# Patient Record
Sex: FEMALE | Race: WHITE | NOT HISPANIC OR LATINO | URBAN - METROPOLITAN AREA
[De-identification: names, ages, dates, MRNs, and addresses within clinical notes are randomized per-mention and may not be internally consistent; named-entity substitution may affect disease eponyms.]

---

## 2017-01-25 ENCOUNTER — OUTPATIENT (OUTPATIENT)
Dept: OUTPATIENT SERVICES | Facility: HOSPITAL | Age: 82
LOS: 1 days | End: 2017-01-25
Payer: MEDICARE

## 2017-01-25 ENCOUNTER — APPOINTMENT (OUTPATIENT)
Dept: ULTRASOUND IMAGING | Facility: CLINIC | Age: 82
End: 2017-01-25

## 2017-01-25 DIAGNOSIS — Z98.89 OTHER SPECIFIED POSTPROCEDURAL STATES: Chronic | ICD-10-CM

## 2017-01-25 DIAGNOSIS — K31.9 DISEASE OF STOMACH AND DUODENUM, UNSPECIFIED: Chronic | ICD-10-CM

## 2017-01-25 DIAGNOSIS — Z00.00 ENCOUNTER FOR GENERAL ADULT MEDICAL EXAMINATION WITHOUT ABNORMAL FINDINGS: ICD-10-CM

## 2017-01-25 DIAGNOSIS — Z95.1 PRESENCE OF AORTOCORONARY BYPASS GRAFT: Chronic | ICD-10-CM

## 2017-01-25 DIAGNOSIS — R92.8 OTHER ABNORMAL AND INCONCLUSIVE FINDINGS ON DIAGNOSTIC IMAGING OF BREAST: ICD-10-CM

## 2017-01-25 DIAGNOSIS — Z90.710 ACQUIRED ABSENCE OF BOTH CERVIX AND UTERUS: Chronic | ICD-10-CM

## 2017-01-25 PROCEDURE — 76642 ULTRASOUND BREAST LIMITED: CPT

## 2017-06-23 ENCOUNTER — APPOINTMENT (OUTPATIENT)
Dept: MAMMOGRAPHY | Facility: CLINIC | Age: 82
End: 2017-06-23

## 2017-06-23 ENCOUNTER — APPOINTMENT (OUTPATIENT)
Dept: ULTRASOUND IMAGING | Facility: CLINIC | Age: 82
End: 2017-06-23

## 2017-06-23 ENCOUNTER — OUTPATIENT (OUTPATIENT)
Dept: OUTPATIENT SERVICES | Facility: HOSPITAL | Age: 82
LOS: 1 days | End: 2017-06-23
Payer: MEDICARE

## 2017-06-23 DIAGNOSIS — Z00.8 ENCOUNTER FOR OTHER GENERAL EXAMINATION: ICD-10-CM

## 2017-06-23 DIAGNOSIS — Z98.89 OTHER SPECIFIED POSTPROCEDURAL STATES: Chronic | ICD-10-CM

## 2017-06-23 DIAGNOSIS — K31.9 DISEASE OF STOMACH AND DUODENUM, UNSPECIFIED: Chronic | ICD-10-CM

## 2017-06-23 DIAGNOSIS — Z95.1 PRESENCE OF AORTOCORONARY BYPASS GRAFT: Chronic | ICD-10-CM

## 2017-06-23 DIAGNOSIS — Z90.710 ACQUIRED ABSENCE OF BOTH CERVIX AND UTERUS: Chronic | ICD-10-CM

## 2017-06-23 PROCEDURE — 77063 BREAST TOMOSYNTHESIS BI: CPT

## 2017-06-23 PROCEDURE — 77067 SCR MAMMO BI INCL CAD: CPT

## 2017-06-23 PROCEDURE — 76641 ULTRASOUND BREAST COMPLETE: CPT

## 2018-01-15 ENCOUNTER — RESULT REVIEW (OUTPATIENT)
Age: 83
End: 2018-01-15

## 2018-01-22 ENCOUNTER — OUTPATIENT (OUTPATIENT)
Dept: OUTPATIENT SERVICES | Facility: HOSPITAL | Age: 83
LOS: 1 days | End: 2018-01-22
Payer: MEDICARE

## 2018-01-22 ENCOUNTER — APPOINTMENT (OUTPATIENT)
Dept: RADIOLOGY | Facility: CLINIC | Age: 83
End: 2018-01-22
Payer: MEDICARE

## 2018-01-22 DIAGNOSIS — Z90.710 ACQUIRED ABSENCE OF BOTH CERVIX AND UTERUS: Chronic | ICD-10-CM

## 2018-01-22 DIAGNOSIS — K31.9 DISEASE OF STOMACH AND DUODENUM, UNSPECIFIED: Chronic | ICD-10-CM

## 2018-01-22 DIAGNOSIS — Z98.89 OTHER SPECIFIED POSTPROCEDURAL STATES: Chronic | ICD-10-CM

## 2018-01-22 DIAGNOSIS — Z95.1 PRESENCE OF AORTOCORONARY BYPASS GRAFT: Chronic | ICD-10-CM

## 2018-01-22 DIAGNOSIS — Z00.8 ENCOUNTER FOR OTHER GENERAL EXAMINATION: ICD-10-CM

## 2018-01-22 PROCEDURE — 77080 DXA BONE DENSITY AXIAL: CPT | Mod: 26

## 2018-01-22 PROCEDURE — 77080 DXA BONE DENSITY AXIAL: CPT

## 2018-06-23 ENCOUNTER — APPOINTMENT (OUTPATIENT)
Dept: ULTRASOUND IMAGING | Facility: CLINIC | Age: 83
End: 2018-06-23
Payer: MEDICARE

## 2018-06-23 ENCOUNTER — APPOINTMENT (OUTPATIENT)
Dept: MAMMOGRAPHY | Facility: CLINIC | Age: 83
End: 2018-06-23
Payer: MEDICARE

## 2018-06-23 ENCOUNTER — OUTPATIENT (OUTPATIENT)
Dept: OUTPATIENT SERVICES | Facility: HOSPITAL | Age: 83
LOS: 1 days | End: 2018-06-23
Payer: MEDICARE

## 2018-06-23 DIAGNOSIS — Z95.1 PRESENCE OF AORTOCORONARY BYPASS GRAFT: Chronic | ICD-10-CM

## 2018-06-23 DIAGNOSIS — Z98.89 OTHER SPECIFIED POSTPROCEDURAL STATES: Chronic | ICD-10-CM

## 2018-06-23 DIAGNOSIS — K31.9 DISEASE OF STOMACH AND DUODENUM, UNSPECIFIED: Chronic | ICD-10-CM

## 2018-06-23 DIAGNOSIS — Z90.710 ACQUIRED ABSENCE OF BOTH CERVIX AND UTERUS: Chronic | ICD-10-CM

## 2018-06-23 DIAGNOSIS — Z00.8 ENCOUNTER FOR OTHER GENERAL EXAMINATION: ICD-10-CM

## 2018-06-23 PROCEDURE — 77063 BREAST TOMOSYNTHESIS BI: CPT | Mod: 26

## 2018-06-23 PROCEDURE — 77067 SCR MAMMO BI INCL CAD: CPT | Mod: 26

## 2018-06-23 PROCEDURE — 77063 BREAST TOMOSYNTHESIS BI: CPT

## 2018-06-23 PROCEDURE — 77067 SCR MAMMO BI INCL CAD: CPT

## 2019-01-04 ENCOUNTER — RECORD ABSTRACTING (OUTPATIENT)
Age: 84
End: 2019-01-04

## 2019-01-04 DIAGNOSIS — Z87.59 PERSONAL HISTORY OF OTHER COMPLICATIONS OF PREGNANCY, CHILDBIRTH AND THE PUERPERIUM: ICD-10-CM

## 2019-01-04 DIAGNOSIS — Z80.1 FAMILY HISTORY OF MALIGNANT NEOPLASM OF TRACHEA, BRONCHUS AND LUNG: ICD-10-CM

## 2019-01-04 DIAGNOSIS — Z86.19 PERSONAL HISTORY OF OTHER INFECTIOUS AND PARASITIC DISEASES: ICD-10-CM

## 2019-01-04 DIAGNOSIS — K43.9 VENTRAL HERNIA W/OUT OBSTRUCTION OR GANGRENE: ICD-10-CM

## 2019-01-04 DIAGNOSIS — Z63.4 DISAPPEARANCE AND DEATH OF FAMILY MEMBER: ICD-10-CM

## 2019-01-04 DIAGNOSIS — I10 ESSENTIAL (PRIMARY) HYPERTENSION: ICD-10-CM

## 2019-01-04 DIAGNOSIS — Z86.39 PERSONAL HISTORY OF OTHER ENDOCRINE, NUTRITIONAL AND METABOLIC DISEASE: ICD-10-CM

## 2019-01-04 DIAGNOSIS — Z80.49 FAMILY HISTORY OF MALIGNANT NEOPLASM OF OTHER GENITAL ORGANS: ICD-10-CM

## 2019-01-04 LAB — CYTOLOGY CVX/VAG DOC THIN PREP: NORMAL

## 2019-01-04 RX ORDER — MULTIVITAMIN
CAPSULE ORAL
Refills: 0 | Status: ACTIVE | COMMUNITY

## 2019-01-04 RX ORDER — CARVEDILOL 3.12 MG/1
3.12 TABLET, FILM COATED ORAL
Refills: 0 | Status: ACTIVE | COMMUNITY

## 2019-01-04 RX ORDER — SIMVASTATIN 20 MG/1
20 TABLET, FILM COATED ORAL
Refills: 0 | Status: ACTIVE | COMMUNITY

## 2019-01-04 SDOH — SOCIAL STABILITY - SOCIAL INSECURITY: DISSAPEARANCE AND DEATH OF FAMILY MEMBER: Z63.4

## 2019-01-30 ENCOUNTER — APPOINTMENT (OUTPATIENT)
Dept: OBGYN | Facility: CLINIC | Age: 84
End: 2019-01-30
Payer: MEDICARE

## 2019-01-30 VITALS
SYSTOLIC BLOOD PRESSURE: 108 MMHG | WEIGHT: 128 LBS | HEIGHT: 60 IN | DIASTOLIC BLOOD PRESSURE: 60 MMHG | BODY MASS INDEX: 25.13 KG/M2

## 2019-01-30 PROCEDURE — 82270 OCCULT BLOOD FECES: CPT

## 2019-01-30 PROCEDURE — G0101: CPT

## 2019-01-30 PROCEDURE — 99397 PER PM REEVAL EST PAT 65+ YR: CPT

## 2019-01-30 PROCEDURE — 81003 URINALYSIS AUTO W/O SCOPE: CPT | Mod: QW

## 2019-03-10 LAB
BILIRUB UR QL STRIP: NORMAL
CLARITY UR: CLEAR
COLLECTION METHOD: NORMAL
DATE COLLECTED: NORMAL
GLUCOSE UR-MCNC: NORMAL
HCG UR QL: 0.2 EU/DL
HEMOCCULT SP1 STL QL: NEGATIVE
HGB UR QL STRIP.AUTO: ABNORMAL
KETONES UR-MCNC: NORMAL
LEUKOCYTE ESTERASE UR QL STRIP: NORMAL
NITRITE UR QL STRIP: POSITIVE
PH UR STRIP: 5.5
PROT UR STRIP-MCNC: NORMAL
SP GR UR STRIP: 1.02

## 2019-04-22 NOTE — PHYSICAL EXAM
[Awake] : awake [Alert] : alert [Soft] : soft [Oriented x3] : oriented to person, place, and time [Normal] : urethra [Atrophy] : atrophy [Dry Mucosa] : dry mucosa [No Bleeding] : there was no active vaginal bleeding [Absent] : absent [Uterine Adnexae] : were not tender and not enlarged [No Tenderness] : no rectal tenderness [RRR, No Murmurs] : RRR, no murmurs [CTAB] : CTAB [Acute Distress] : no acute distress [Mass] : no breast mass [Nipple Discharge] : no nipple discharge [Axillary LAD] : no axillary lymphadenopathy [Tender] : non tender [Discharge] : no discharge [Tenderness] : no tenderness [Occult Blood] : occult blood test from digital rectal exam was negative

## 2019-06-28 ENCOUNTER — OUTPATIENT (OUTPATIENT)
Dept: OUTPATIENT SERVICES | Facility: HOSPITAL | Age: 84
LOS: 1 days | End: 2019-06-28
Payer: MEDICARE

## 2019-06-28 ENCOUNTER — APPOINTMENT (OUTPATIENT)
Dept: MAMMOGRAPHY | Facility: CLINIC | Age: 84
End: 2019-06-28
Payer: MEDICARE

## 2019-06-28 DIAGNOSIS — Z98.89 OTHER SPECIFIED POSTPROCEDURAL STATES: Chronic | ICD-10-CM

## 2019-06-28 DIAGNOSIS — Z90.710 ACQUIRED ABSENCE OF BOTH CERVIX AND UTERUS: Chronic | ICD-10-CM

## 2019-06-28 DIAGNOSIS — Z00.00 ENCOUNTER FOR GENERAL ADULT MEDICAL EXAMINATION WITHOUT ABNORMAL FINDINGS: ICD-10-CM

## 2019-06-28 DIAGNOSIS — Z95.1 PRESENCE OF AORTOCORONARY BYPASS GRAFT: Chronic | ICD-10-CM

## 2019-06-28 DIAGNOSIS — K31.9 DISEASE OF STOMACH AND DUODENUM, UNSPECIFIED: Chronic | ICD-10-CM

## 2019-06-28 PROCEDURE — 77063 BREAST TOMOSYNTHESIS BI: CPT

## 2019-06-28 PROCEDURE — 77067 SCR MAMMO BI INCL CAD: CPT

## 2019-06-28 PROCEDURE — 77063 BREAST TOMOSYNTHESIS BI: CPT | Mod: 26

## 2019-06-28 PROCEDURE — 77067 SCR MAMMO BI INCL CAD: CPT | Mod: 26

## 2019-09-19 ENCOUNTER — INPATIENT (INPATIENT)
Facility: HOSPITAL | Age: 84
LOS: 3 days | Discharge: HOME | DRG: 377 | End: 2019-09-23
Attending: HOSPITALIST | Admitting: INTERNAL MEDICINE
Payer: MEDICARE

## 2019-09-19 VITALS
TEMPERATURE: 98 F | OXYGEN SATURATION: 99 % | HEART RATE: 105 BPM | HEIGHT: 65 IN | SYSTOLIC BLOOD PRESSURE: 130 MMHG | RESPIRATION RATE: 18 BRPM | DIASTOLIC BLOOD PRESSURE: 84 MMHG | WEIGHT: 134.92 LBS

## 2019-09-19 DIAGNOSIS — K31.9 DISEASE OF STOMACH AND DUODENUM, UNSPECIFIED: Chronic | ICD-10-CM

## 2019-09-19 DIAGNOSIS — Z95.1 PRESENCE OF AORTOCORONARY BYPASS GRAFT: Chronic | ICD-10-CM

## 2019-09-19 DIAGNOSIS — Z98.89 OTHER SPECIFIED POSTPROCEDURAL STATES: Chronic | ICD-10-CM

## 2019-09-19 DIAGNOSIS — K92.2 GASTROINTESTINAL HEMORRHAGE, UNSPECIFIED: ICD-10-CM

## 2019-09-19 DIAGNOSIS — K92.1 MELENA: ICD-10-CM

## 2019-09-19 DIAGNOSIS — Z90.710 ACQUIRED ABSENCE OF BOTH CERVIX AND UTERUS: Chronic | ICD-10-CM

## 2019-09-19 LAB
ALBUMIN SERPL ELPH-MCNC: 3.2 G/DL — LOW (ref 3.3–5.2)
ALP SERPL-CCNC: 39 U/L — LOW (ref 40–120)
ALT FLD-CCNC: 17 U/L — SIGNIFICANT CHANGE UP
ANION GAP SERPL CALC-SCNC: 17 MMOL/L — SIGNIFICANT CHANGE UP (ref 5–17)
APTT BLD: 24.3 SEC — LOW (ref 27.5–36.3)
AST SERPL-CCNC: 16 U/L — SIGNIFICANT CHANGE UP
BILIRUB SERPL-MCNC: <0.2 MG/DL — LOW (ref 0.4–2)
BLD GP AB SCN SERPL QL: SIGNIFICANT CHANGE UP
BUN SERPL-MCNC: 49 MG/DL — HIGH (ref 8–20)
CALCIUM SERPL-MCNC: 8.6 MG/DL — SIGNIFICANT CHANGE UP (ref 8.6–10.2)
CHLORIDE SERPL-SCNC: 108 MMOL/L — HIGH (ref 98–107)
CO2 SERPL-SCNC: 18 MMOL/L — LOW (ref 22–29)
CREAT SERPL-MCNC: 0.93 MG/DL — SIGNIFICANT CHANGE UP (ref 0.5–1.3)
GLUCOSE SERPL-MCNC: 162 MG/DL — HIGH (ref 70–115)
HCT VFR BLD CALC: 12.8 % — CRITICAL LOW (ref 34.5–45)
HCT VFR BLD CALC: 22.7 % — LOW (ref 34.5–45)
HCT VFR BLD CALC: 25.7 % — LOW (ref 34.5–45)
HGB BLD-MCNC: 4.1 G/DL — CRITICAL LOW (ref 11.5–15.5)
HGB BLD-MCNC: 7.9 G/DL — LOW (ref 11.5–15.5)
HGB BLD-MCNC: 8.7 G/DL — LOW (ref 11.5–15.5)
INR BLD: 1.13 RATIO — SIGNIFICANT CHANGE UP (ref 0.88–1.16)
MCHC RBC-ENTMCNC: 30.1 PG — SIGNIFICANT CHANGE UP (ref 27–34)
MCHC RBC-ENTMCNC: 32 GM/DL — SIGNIFICANT CHANGE UP (ref 32–36)
MCV RBC AUTO: 94.1 FL — SIGNIFICANT CHANGE UP (ref 80–100)
PLATELET # BLD AUTO: 332 K/UL — SIGNIFICANT CHANGE UP (ref 150–400)
POTASSIUM SERPL-MCNC: 3.9 MMOL/L — SIGNIFICANT CHANGE UP (ref 3.5–5.3)
POTASSIUM SERPL-SCNC: 3.9 MMOL/L — SIGNIFICANT CHANGE UP (ref 3.5–5.3)
PROT SERPL-MCNC: 5.1 G/DL — LOW (ref 6.6–8.7)
PROTHROM AB SERPL-ACNC: 13 SEC — HIGH (ref 10–12.9)
RBC # BLD: 1.36 M/UL — LOW (ref 3.8–5.2)
RBC # FLD: 14.4 % — SIGNIFICANT CHANGE UP (ref 10.3–14.5)
SODIUM SERPL-SCNC: 143 MMOL/L — SIGNIFICANT CHANGE UP (ref 135–145)
TROPONIN T SERPL-MCNC: <0.01 NG/ML — SIGNIFICANT CHANGE UP (ref 0–0.06)
WBC # BLD: 21.32 K/UL — HIGH (ref 3.8–10.5)
WBC # FLD AUTO: 21.32 K/UL — HIGH (ref 3.8–10.5)

## 2019-09-19 PROCEDURE — 73502 X-RAY EXAM HIP UNI 2-3 VIEWS: CPT | Mod: 26,LT

## 2019-09-19 PROCEDURE — 99291 CRITICAL CARE FIRST HOUR: CPT | Mod: 25

## 2019-09-19 PROCEDURE — 99222 1ST HOSP IP/OBS MODERATE 55: CPT

## 2019-09-19 PROCEDURE — 71045 X-RAY EXAM CHEST 1 VIEW: CPT | Mod: 26

## 2019-09-19 PROCEDURE — 93010 ELECTROCARDIOGRAM REPORT: CPT

## 2019-09-19 PROCEDURE — 74174 CTA ABD&PLVS W/CONTRAST: CPT | Mod: 26

## 2019-09-19 PROCEDURE — 70450 CT HEAD/BRAIN W/O DYE: CPT | Mod: 26

## 2019-09-19 PROCEDURE — 36556 INSERT NON-TUNNEL CV CATH: CPT

## 2019-09-19 RX ORDER — SODIUM CHLORIDE 9 MG/ML
1000 INJECTION INTRAMUSCULAR; INTRAVENOUS; SUBCUTANEOUS ONCE
Refills: 0 | Status: COMPLETED | OUTPATIENT
Start: 2019-09-19 | End: 2019-09-19

## 2019-09-19 RX ORDER — METOCLOPRAMIDE HCL 10 MG
10 TABLET ORAL ONCE
Refills: 0 | Status: COMPLETED | OUTPATIENT
Start: 2019-09-19 | End: 2019-09-19

## 2019-09-19 RX ORDER — PANTOPRAZOLE SODIUM 20 MG/1
80 TABLET, DELAYED RELEASE ORAL ONCE
Refills: 0 | Status: COMPLETED | OUTPATIENT
Start: 2019-09-19 | End: 2019-09-19

## 2019-09-19 RX ORDER — CHLORHEXIDINE GLUCONATE 213 G/1000ML
1 SOLUTION TOPICAL
Refills: 0 | Status: DISCONTINUED | OUTPATIENT
Start: 2019-09-19 | End: 2019-09-20

## 2019-09-19 RX ORDER — FENTANYL CITRATE 50 UG/ML
25 INJECTION INTRAVENOUS ONCE
Refills: 0 | Status: DISCONTINUED | OUTPATIENT
Start: 2019-09-19 | End: 2019-09-19

## 2019-09-19 RX ORDER — PANTOPRAZOLE SODIUM 20 MG/1
8 TABLET, DELAYED RELEASE ORAL
Qty: 80 | Refills: 0 | Status: DISCONTINUED | OUTPATIENT
Start: 2019-09-19 | End: 2019-09-20

## 2019-09-19 RX ADMIN — SODIUM CHLORIDE 1000 MILLILITER(S): 9 INJECTION INTRAMUSCULAR; INTRAVENOUS; SUBCUTANEOUS at 10:10

## 2019-09-19 RX ADMIN — PANTOPRAZOLE SODIUM 80 MILLIGRAM(S): 20 TABLET, DELAYED RELEASE ORAL at 10:04

## 2019-09-19 RX ADMIN — SODIUM CHLORIDE 1000 MILLILITER(S): 9 INJECTION INTRAMUSCULAR; INTRAVENOUS; SUBCUTANEOUS at 10:44

## 2019-09-19 RX ADMIN — FENTANYL CITRATE 25 MICROGRAM(S): 50 INJECTION INTRAVENOUS at 12:46

## 2019-09-19 RX ADMIN — SODIUM CHLORIDE 1000 MILLILITER(S): 9 INJECTION INTRAMUSCULAR; INTRAVENOUS; SUBCUTANEOUS at 09:06

## 2019-09-19 RX ADMIN — FENTANYL CITRATE 25 MICROGRAM(S): 50 INJECTION INTRAVENOUS at 12:26

## 2019-09-19 RX ADMIN — PANTOPRAZOLE SODIUM 10 MG/HR: 20 TABLET, DELAYED RELEASE ORAL at 10:04

## 2019-09-19 RX ADMIN — SODIUM CHLORIDE 1000 MILLILITER(S): 9 INJECTION INTRAMUSCULAR; INTRAVENOUS; SUBCUTANEOUS at 11:45

## 2019-09-19 RX ADMIN — Medication 10 MILLIGRAM(S): at 10:43

## 2019-09-19 NOTE — H&P ADULT - NSICDXPASTSURGICALHX_GEN_ALL_CORE_FT
PAST SURGICAL HISTORY:  Gastric disorder gastric hernia repair- 3/16/12    S/P arthroscopy of shoulder 2001- left rotator cuff repair    S/P arthroscopy of shoulder right rotator cuff repair- 2006    S/P CABG x 3 8/18/11    S/P discectomy lumbar- 1996    S/P hysterectomy 1972

## 2019-09-19 NOTE — ED ADULT NURSE REASSESSMENT NOTE - NS ED NURSE REASSESS COMMENT FT1
Pt. mental status unchanged. Pt. respirations even and unlabored. 2nd PBRC hung at 1305. Pt. educated to inform nursing staff of SOB, back pain and other transfusion reactions. Pt. mental status unchanged. Pt. respirations even and unlabored. 2nd PBRC hung at 1305. Pt. educated to inform nursing staff of SOB, back pain and other transfusion reactions. Pt. going to MICU.

## 2019-09-19 NOTE — H&P ADULT - ASSESSMENT
86 y/o female with PMH of CAD s/p CABG, arthritis, hiatal hernia presents to [facility] for 2 days history of black stools and dark vomitus. Found to have GI bleed with hypotension and mild tachycardia, most likely UGIB possible ulcer vs. slow LGIB. Will admit to MICU for further management and observation.    Plan discussed with ICU attending Dr. Cárdenas      Problem List:   1) GI bleed  2) acute blood loss anemia  3) CAD        1) GI bleed  - Hgb 4   - s/p 2L IVF and 1 unit PRBCs. Will order additional 1 unit PRBC and recheck hgb  - Aim for hgb around 7  - GI consult placed  - protonix gtt  - CTA abdomen/pelvis without etiology of bleed  - Not on anticoagulants     2) Aute blood loss anemia  - from GI bleed  - transfuse    3) CAD  -hold ASA/statin/beta blocker/ ACE-I       DISPO: to ICU, patient DNR per daughter, will bring in paperwork       TIME SPENT: 40 min

## 2019-09-19 NOTE — ED ADULT NURSE REASSESSMENT NOTE - NS ED NURSE REASSESS COMMENT FT1
Pt BP dropping, Dr. Phan called and made aware, no acute s/s of respiratory distress noted or reported at this time, report given to critical care RN DM

## 2019-09-19 NOTE — ED ADULT TRIAGE NOTE - CHIEF COMPLAINT QUOTE
Pt with black diarrhea, lower abdominal pain and coffee ground emesis since Sunday. Pt on takes ASA 81mg daily. Pt reports stool was "pouring out". Appears very pale. Received Zofran 4mg IVP by EMS

## 2019-09-19 NOTE — ED PROVIDER NOTE - PMH
Arthritis    CAD (coronary artery disease)    Disc disorder of lumbar region    Drop foot gait  left foot  Hernia  hiatal, gastric hernias  Hernia, ventral    HLD (hyperlipidemia)    HTN (hypertension)    Rotator cuff (capsule) sprain

## 2019-09-19 NOTE — ED ADULT NURSE NOTE - FAMILY HISTORY
Family history of lung cancer, son- small cell carcinoma  at 43 y/o     Father  Still living? No  Family history of coronary artery disease, Age at diagnosis: Age Unknown     Mother  Still living? No  Family history of Alzheimer's disease, Age at diagnosis: Age Unknown     Child  Still living? No  Family history of Hodgkin's lymphoma, Age at diagnosis: Age Unknown

## 2019-09-19 NOTE — ED PROVIDER NOTE - CARE PLAN
Principal Discharge DX:	GI bleed requiring more than 4 units of blood in 24 hours, ICU, or surgery  Secondary Diagnosis:	Iron deficiency anemia due to chronic blood loss

## 2019-09-19 NOTE — ED ADULT NURSE NOTE - NSIMPLEMENTINTERV_GEN_ALL_ED
Implemented All Fall Risk Interventions:  [place] to call system. Call bell, personal items and telephone within reach. Instruct patient to call for assistance. Room bathroom lighting operational. Non-slip footwear when patient is off stretcher. Physically safe environment: no spills, clutter or unnecessary equipment. Stretcher in lowest position, wheels locked, appropriate side rails in place. Provide visual cue, wrist band, yellow gown, etc. Monitor gait and stability. Monitor for mental status changes and reorient to person, place, and time. Review medications for side effects contributing to fall risk. Reinforce activity limits and safety measures with patient and family.

## 2019-09-19 NOTE — ED PROVIDER NOTE - OBJECTIVE STATEMENT
Patient with PMH CAD s/p CABG (on ASA), HTN, HLD presents complaining of 3 days of dark tarry stools with vomiting dark substance. She was seen by Dr. Wells (GI) who started her on an acid reflux medication. Patient states that last night she had a large amount of liquid dark stool that was "pouring out of me" until about 4 am. Per daughter she appears very pale. Patient states she also fell yesterday while walking in the kitchen, hit her head but did not lose consciousness. She was feeling very short of breath this morning and it improved when ambulance arrived and placed NC O2. She also notes some chest pain yesterday. She has never had anything like this before. Her abdominal pain is minimal at this time.

## 2019-09-19 NOTE — H&P ADULT - HISTORY OF PRESENT ILLNESS
86 y/o female with PMH of CAD s/p CABG, arthritis, hiatal hernia presents to [facility] for 2 days history of black stools and dark vomitus. Per patient and daughter at bedside she has not been feeling well for about 3 weeks, has been loosing weight and decreased PO intake. Yesterday she states she had dark stools and vomited dark substance x1. All day she was " not feeling well". Takes aspirin 325mg daily. No other anticoagulants or anti-platelets. Denies NSAID use. Denies alcohol abuse or history of cirrhosis. Patient is alert and oriented and provides good history at the bedside.     On arrival to the ER found to be hypotensive to 80s, tachy to 110. Hgb found to be 4. CTA abdomen/pelvis without etiology of bleeding. Central line placed by ER team, got 2 L IVF, and 1 unit of PRBCs infusing as I saw the patient. BP at that time 101/74. .

## 2019-09-19 NOTE — ED ADULT NURSE NOTE - PSH
Gastric disorder  gastric hernia repair- 3/16/12  S/P arthroscopy of shoulder  right rotator cuff repair- 2006  S/P arthroscopy of shoulder  2001- left rotator cuff repair  S/P CABG x 3  8/18/11  S/P discectomy  lumbar- 1996  S/P hysterectomy  1972

## 2019-09-19 NOTE — ED ADULT NURSE NOTE - OBJECTIVE STATEMENT
Patient  presents to ED withg c/o dark tarry stools x 3 days associated  with vomiting dark substance that began around about 4 am. Per daughter she appears very pale. Patient presents to ED A/Ox4, VSS, denies chest pain or sob.  Respirations are even and unlabored, lungs cta, +bowel x4 quads, abdomen soft, nontender/nondistended, skin w/d/i. Patient states she also fell yesterday while walking in the kitchen, hit her head but did not lose consciousness.

## 2019-09-19 NOTE — ED ADULT NURSE REASSESSMENT NOTE - NS ED NURSE REASSESS COMMENT FT1
Pt brought back to critical care r/t significant drop in BP and central line placement.  Dr. Phan and Dr. Paniagua at bedside, will continue to monitor

## 2019-09-19 NOTE — CONSULT NOTE ADULT - SUBJECTIVE AND OBJECTIVE BOX
Patient is a 87y old  Female who presents with a chief complaint of dark loose stool    HPI: 87 year old female with onset of very dark, possibly black stools three days ago. Yesterday she experienced voluminous dark/black loose stools that lasted throughout the night. Hbg 4.1. Earlier today she had some dry haves and vomited a small amount of clear liquid. Since  she has a known hiatal hernia with early satiety with epigastric pain if she would over eat and as a result has frequent very small meals. For the past several weeks these symptoms are worse with a significant decrease in her appetite as well as occasional heartburn. One week ago apparantly placed on a PPI by Dr. Wells with some decrease in her symptoms. No prior hx of PUD or EGD. She has undergone two prior unremarkable colonoscopies. 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      REVIEW OF SYSTEMS:    CONSTITUTIONAL: No fever, weight loss, or fatigue  EYES: No eye pain, visual disturbances, or discharge  ENMT:  No difficulty hearing, tinnitus, vertigo; No sinus or throat pain  NECK: No pain or stiffness  RESPIRATORY: No cough, wheezing, chills or hemoptysis; No shortness of breath  CARDIOVASCULAR: No chest pain, palpitations, dizziness, or leg swelling  GASTROINTESTINAL: as above  NEUROLOGICAL: No headaches, memory loss, loss of strength, numbness, or tremors  SKIN: No itching, burning, rashes, or lesions   LYMPH NODES: No enlarged glands  MUSCULOSKELETAL: No joint pain or swelling; No muscle, back, or extremity pain  PSYCHIATRIC: No depression, anxiety, mood swings, or difficulty sleeping  HEME/LYMPH: No easy bruising, or bleeding gums  ALLERY AND IMMUNOLOGIC: No hives or eczema      PAST MEDICAL & SURGICAL HISTORY:  Drop foot gait: left foot  Disc disorder of lumbar region  Hernia: hiatal, gastric hernias  Hernia, ventral  Rotator cuff (capsule) sprain  HLD (hyperlipidemia)  HTN (hypertension)  CAD (coronary artery disease)  Arthritis  S/P discectomy: lumbar-   ventral or incisional hernia repair  S/P CABG x 3: 11  S/P arthroscopy of shoulder: right rotator cuff repair-   S/P arthroscopy of shoulder: - left rotator cuff repair  S/P hysterectomy: 1972      FAMILY HISTORY:  Family history of lung cancer: son- small cell carcinoma  at 43 y/o  Family history of Hodgkin's lymphoma (Child): son  at 44.  Family history of Alzheimer's disease: mother  at 93 complications of alzheimers  Family history of coronary artery disease: father  at 52 from MI      SOCIAL HISTORY:  Smoking Status: [ ] Current, [ ] Former, [x ] Never  Pack Years:  Alcohol Use: rare    Home Medications:  aspirin 325 mg oral tablet: 1 tab(s) orally once a day  (14 Aug 2014 10:04)  carvedilol 3.125 mg oral tablet: 1 tab(s) orally 2 times a day (14 Aug 2014 10:04)  Colace sodium 100 mg oral capsule: 1 cap(s) orally 2 times a day (14 Aug 2014 10:04)  enalapril 2.5 mg oral tablet: 1 tab(s) orally once a day (14 Aug 2014 10:04)  furosemide: 10 milligram(s) orally once a day (14 Aug 2014 10:04)  oxyCODONE 10 mg oral tablet: 1 tab(s) orally every 4 hours, As needed, Severe Pain (16 Aug 2014 08:36)  oxyCODONE 5 mg oral tablet: 1 tab(s) orally every 4 hours, As needed, Moderate Pain (16 Aug 2014 08:36)  simvastatin 20 mg oral tablet: 1 tab(s) orally once a day (at bedtime) (14 Aug 2014 10:04)      MEDICATIONS:  MEDICATIONS  (STANDING):  chlorhexidine 2% Cloths 1 Application(s) Topical <User Schedule>  pantoprazole Infusion 8 mG/Hr (10 mL/Hr) IV Continuous <Continuous>    MEDICATIONS  (PRN):      Allergies    No Known Allergies    Intolerances        Vital Signs Last 24 Hrs  T(C): 36.5 (19 Sep 2019 12:05), Max: 36.7 (19 Sep 2019 08:59)  T(F): 97.7 (19 Sep 2019 12:05), Max: 98.1 (19 Sep 2019 08:59)  HR: 101 (19 Sep 2019 12:32) (89 - 105)  BP: 102/54 (19 Sep 2019 12:32) (70/42 - 132/59)  BP(mean): 73 (19 Sep 2019 12:05) (62 - 73)  RR: 20 (19 Sep 2019 12:32) (10 - 22)  SpO2: 100% (19 Sep 2019 12:32) (99% - 100%)        PHYSICAL EXAM:    General: Well developed; well nourished; in no acute distress, very pale  HEENT: MMM, conjunctiva and sclera clear  Lungs: Clear  Heart: Rhythm Regular, No Murmurs  Gastrointestinal: Soft, non-tender non-distended; Normal bowel sounds; No rebound or guarding; No Organomegaly & No Masses  Extremities: Normal range of motion, No clubbing, cyanosis or edema  Neurological: Alert and oriented x3, Non-focal  Skin: Warm and dry. No obvious rash  Rectal: No Masses, small amount of dark red tinged formed stool      LABS:                        4.1    21.32 )-----------( 332      ( 19 Sep 2019 08:50 )             12.8         143  |  108<H>  |  49.0<H>  ----------------------------<  162<H>  3.9   |  18.0<L>  |  0.93    Ca    8.6      19 Sep 2019 08:50    TPro  5.1<L>  /  Alb  3.2<L>  /  TBili  <0.2<L>  /  DBili  x   /  AST  16  /  ALT  17  /  AlkPhos  39<L>            RADIOLOGY & ADDITIONAL STUDIES:    < from: CT Angio Abdomen and Pelvis w/ IV Cont (19 @ 10:04) >   EXAM:  CT ANGIO ABD PELV (W)AW IC                          PROCEDURE DATE:  2019          INTERPRETATION:  Contrast enhanced CT of the abdomen and pelvis . GI   bleeding scan protocol  COMPARISON: None.    CLINICAL HISTORY:GI bleed..    Technique: contiguous axial images were obtained with 2.5 mm slice   thickness prior to and after intravenous contrast administration.  Reformatted sagittal, coronal images were obtained in the noncontrast,   vascular phase and delayed 122 second phase ofbleeding protocol  100 ml of Omnipaque were injected intravenously, and 0 ml were discarded,   without complications noted.   Maximum intensity projection,(MIP) 3-D,  imaging was created and   interpreted.  FINDINGS:   No evidence of active gastrointestinal bleeding seen.  The lung bases are clear.     The visualized portions of the heart are normal.    There is no free intra-abdominal air or ascites.    The liver, spleen, pancreas, adrenal glands, gallbladder are normal.    There is no intra or extrahepatic biliary ductal dilatation.  I there is a moderate gastroesophageal hiatal hernia.  The stomach, duodenum, small and large bowel and appendix are within   normal limits.    Both kidneys show normal uptake of contrast media without masses or   hydronephrosis.      The urinary bladder shows normal morphology and contour.    Status post hysterectomy. There are no retroperitoneal masses or abnormal   lymphadenopathy.  The retroperitoneal vessels show atherosclerotic calcified plaques   throughout  nondilated abdominal aorta and iliac arteries. An  There is a high-grade stenoses involving the origins of the celiac and   superior mesenteric arteries. Inferior mesenteric artery is not clearly   visualized. There is severe degenerative spondylosis of scoliotic lumbar   spine convex to the LEFT without acute fracture deformity.    IMPRESSION:     No evidence of active GI bleeding as described. 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   Inferior mesenteric artery not visualized.                  WILLIAM MEDINA M.D., ATTENDING RADIOLOGIST  This document has been electronically signed. Sep 19 2019 10:21AM                  < end of copied text > Patient is a 87y old  Female who presents with a chief complaint of dark loose stool    HPI: 87 year old female with onset of very dark, possibly black stools three days ago. Yesterday she experienced voluminous dark/black loose stools that lasted throughout the night with intermittant mild lower abdominal cramps. On arrival to ER Hbg 4.1. Earlier today she had some dry haves and vomited a small amount of clear liquid. Also with transient hypotension while in ER with BP of 70/40 which responded to fluids and blood. Noted some SOB and transient chest pain earlier today which has resolved as well. Since 2012 she has a known hiatal hernia with early satiety with epigastric pain if she would over eat and as a result has frequent very small meals. For the past several weeks these symptoms are worse with a significant decrease in her appetite as well as occasional heartburn. One week ago apparantly placed on a PPI by Dr. Wells with some decrease in her symptoms. No prior hx of PUD or EGD. She has undergone two prior unremarkable colonoscopies. 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      REVIEW OF SYSTEMS:    CONSTITUTIONAL: No fever, weight loss, or fatigue  EYES: No eye pain, visual disturbances, or discharge  ENMT:  No difficulty hearing, tinnitus, vertigo; No sinus or throat pain  NECK: No pain or stiffness  RESPIRATORY: No cough, wheezing, chills or hemoptysis; No shortness of breath  CARDIOVASCULAR: No chest pain, palpitations, dizziness, or leg swelling  GASTROINTESTINAL: as above  NEUROLOGICAL: No headaches, memory loss, loss of strength, numbness, or tremors  SKIN: No itching, burning, rashes, or lesions   LYMPH NODES: No enlarged glands  MUSCULOSKELETAL: No joint pain or swelling; No muscle, back, or extremity pain  PSYCHIATRIC: No depression, anxiety, mood swings, or difficulty sleeping  HEME/LYMPH: No easy bruising, or bleeding gums  ALLERY AND IMMUNOLOGIC: No hives or eczema      PAST MEDICAL & SURGICAL HISTORY:  Drop foot gait: left foot  Disc disorder of lumbar region  Hernia: hiatal, gastric hernias  Hernia, ventral  Rotator cuff (capsule) sprain  HLD (hyperlipidemia)  HTN (hypertension)  CAD (coronary artery disease)  Arthritis  S/P discectomy: lumbar-   ventral or incisional hernia repair  S/P CABG x 3: 11  S/P arthroscopy of shoulder: right rotator cuff repair-   S/P arthroscopy of shoulder: - left rotator cuff repair  S/P hysterectomy:       FAMILY HISTORY:  Family history of lung cancer: son- small cell carcinoma  at 45 y/o  Family history of Hodgkin's lymphoma (Child): son  at 44.  Family history of Alzheimer's disease: mother  at 93 complications of alzheimers  Family history of coronary artery disease: father  at 52 from MI      SOCIAL HISTORY:  Smoking Status: [ ] Current, [ ] Former, [x ] Never  Pack Years:  Alcohol Use: rare    Home Medications:  aspirin 325 mg oral tablet: 1 tab(s) orally once a day  (14 Aug 2014 10:04)  carvedilol 3.125 mg oral tablet: 1 tab(s) orally 2 times a day (14 Aug 2014 10:04)  Colace sodium 100 mg oral capsule: 1 cap(s) orally 2 times a day (14 Aug 2014 10:04)  enalapril 2.5 mg oral tablet: 1 tab(s) orally once a day (14 Aug 2014 10:04)  furosemide: 10 milligram(s) orally once a day (14 Aug 2014 10:04)  oxyCODONE 10 mg oral tablet: 1 tab(s) orally every 4 hours, As needed, Severe Pain (16 Aug 2014 08:36)  oxyCODONE 5 mg oral tablet: 1 tab(s) orally every 4 hours, As needed, Moderate Pain (16 Aug 2014 08:36)  simvastatin 20 mg oral tablet: 1 tab(s) orally once a day (at bedtime) (14 Aug 2014 10:04)      MEDICATIONS:  MEDICATIONS  (STANDING):  chlorhexidine 2% Cloths 1 Application(s) Topical <User Schedule>  pantoprazole Infusion 8 mG/Hr (10 mL/Hr) IV Continuous <Continuous>    MEDICATIONS  (PRN):      Allergies    No Known Allergies    Intolerances        Vital Signs Last 24 Hrs  T(C): 36.5 (19 Sep 2019 12:05), Max: 36.7 (19 Sep 2019 08:59)  T(F): 97.7 (19 Sep 2019 12:05), Max: 98.1 (19 Sep 2019 08:59)  HR: 101 (19 Sep 2019 12:32) (89 - 105)  BP: 102/54 (19 Sep 2019 12:32) (70/42 - 132/59)  BP(mean): 73 (19 Sep 2019 12:05) (62 - 73)  RR: 20 (19 Sep 2019 12:32) (10 - 22)  SpO2: 100% (19 Sep 2019 12:32) (99% - 100%)        PHYSICAL EXAM:    General: Well developed; well nourished; in no acute distress, very pale  HEENT: MMM, conjunctiva and sclera clear  Lungs: Clear  Heart: Rhythm Regular, No Murmurs  Gastrointestinal: Soft, non-tender non-distended; Normal bowel sounds; No rebound or guarding; No Organomegaly & No Masses  Extremities: Normal range of motion, No clubbing, cyanosis or edema  Neurological: Alert and oriented x3, Non-focal  Skin: Warm and dry. No obvious rash  Rectal: No Masses, small amount of dark red tinged formed stool      LABS:                        4.1    21.32 )-----------( 332      ( 19 Sep 2019 08:50 )             12.8         143  |  108<H>  |  49.0<H>  ----------------------------<  162<H>  3.9   |  18.0<L>  |  0.93    Ca    8.6      19 Sep 2019 08:50    TPro  5.1<L>  /  Alb  3.2<L>  /  TBili  <0.2<L>  /  DBili  x   /  AST  16  /  ALT  17  /  AlkPhos  39<L>            RADIOLOGY & ADDITIONAL STUDIES:    < from: CT Angio Abdomen and Pelvis w/ IV Cont (19 @ 10:04) >   EXAM:  CT ANGIO ABD PELV (W)AW IC                          PROCEDURE DATE:  2019          INTERPRETATION:  Contrast enhanced CT of the abdomen and pelvis . GI   bleeding scan protocol  COMPARISON: None.    CLINICAL HISTORY:GI bleed..    Technique: contiguous axial images were obtained with 2.5 mm slice   thickness prior to and after intravenous contrast administration.  Reformatted sagittal, coronal images were obtained in the noncontrast,   vascular phase and delayed 122 second phase ofbleeding protocol  100 ml of Omnipaque were injected intravenously, and 0 ml were discarded,   without complications noted.   Maximum intensity projection,(MIP) 3-D,  imaging was created and   interpreted.  FINDINGS:   No evidence of active gastrointestinal bleeding seen.  The lung bases are clear.     The visualized portions of the heart are normal.    There is no free intra-abdominal air or ascites.    The liver, spleen, pancreas, adrenal glands, gallbladder are normal.    There is no intra or extrahepatic biliary ductal dilatation.  I there is a moderate gastroesophageal hiatal hernia.  The stomach, duodenum, small and large bowel and appendix are within   normal limits.    Both kidneys show normal uptake of contrast media without masses or   hydronephrosis.      The urinary bladder shows normal morphology and contour.    Status post hysterectomy. There are no retroperitoneal masses or abnormal   lymphadenopathy.  The retroperitoneal vessels show atherosclerotic calcified plaques   throughout  nondilated abdominal aorta and iliac arteries. An  There is a high-grade stenoses involving the origins of the celiac and   superior mesenteric arteries. Inferior mesenteric artery is not clearly   visualized. There is severe degenerative spondylosis of scoliotic lumbar   spine convex to the LEFT without acute fracture deformity.    IMPRESSION:     No evidence of active GI bleeding as described. 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   Inferior mesenteric artery not visualized.                  WILLIAM MEDINA M.D., ATTENDING RADIOLOGIST  This document has been electronically signed. Sep 19 2019 10:21AM                  < end of copied text >

## 2019-09-19 NOTE — ED PROVIDER NOTE - PROGRESS NOTE DETAILS
Hgb 4.1. Consent for blood transfusion obtained and scanned in chart. CVC placed by Dr. Recio, consent obtained and placed on the chart. Emergency blood administration started due to dropping pressures. I discussed with ICU who will evaluate the patient.

## 2019-09-19 NOTE — CONSULT NOTE ADULT - PROBLEM SELECTOR RECOMMENDATION 9
most likely UGI due to PUD, reflux esophagitis or haile lesions due to hiatal hernia. Neoplasm and AVM possible but lesss likely. Transfuse to Hgb of 8 with constant infusion pantoprazole. Needs cardiac clearance for EGD which I anticipate for tomorrow AM. NPO for now as well except meds.

## 2019-09-19 NOTE — H&P ADULT - NSICDXPASTMEDICALHX_GEN_ALL_CORE_FT
PAST MEDICAL HISTORY:  Arthritis     CAD (coronary artery disease)     Disc disorder of lumbar region     Drop foot gait left foot    Hernia hiatal, gastric hernias    Hernia, ventral     HLD (hyperlipidemia)     HTN (hypertension)     Rotator cuff (capsule) sprain

## 2019-09-19 NOTE — ED ADULT NURSE REASSESSMENT NOTE - NS ED NURSE REASSESS COMMENT FT1
Blood transfusion in progress, well tolerated. Negative signs/symptoms adverse reaction. Blood pressure improving. Patient remains pale, cardiac monitor in place. Will continue to monitor.

## 2019-09-19 NOTE — ED ADULT NURSE REASSESSMENT NOTE - NS ED NURSE REASSESS COMMENT FT1
Assumed pt care at 0930.  Pt a&ox4 c/o abdominal pain and weakness with dark stool.  No acute s/s of respiratory distress noted or reported at this time, will continue to monitor

## 2019-09-19 NOTE — ED PROVIDER NOTE - CLINICAL SUMMARY MEDICAL DECISION MAKING FREE TEXT BOX
86 y/o F presents with symptoms of GI bleeding x 3 days, pale, no hypotension, but was short of breath, improved with NC O2. Will start on protonix bolus and gtt, CT angio to look for source of bleeding, IV hydration and will likely require blood transfusion.

## 2019-09-19 NOTE — H&P ADULT - ATTENDING COMMENTS
87F w/ PMHx CAD s/p CABG, hiatal hernia presents to ED w/ melanotic stools for the past 2 days. Also gives h/o N/V, poor appetite and recent weight loss. Pt was hypotensive, tachycardic and initial labs revealed Hb 4.1. Fluid resuscitated w/ 2L and RIJ TLC inserted. Currently getting pRBC transfusion w/ improvement in hemodynamics. On aspirin 325mg QD, not on any other AC.    Admit to ICU  Transfuse PRN to keep Hb > 7.0. CBC q6h  Continue IV PPI gtt  GI evaluation   Will keep NPO  Hold ASA and home antihypertensives    Family was updated on current status and the possibility of hemodynamic compromise/ hemorrhagic shock was discussed. She was DNR from previous admissions. Family was adviced to bring TARAH. She is DNR for now

## 2019-09-19 NOTE — ED ADULT NURSE REASSESSMENT NOTE - NS ED NURSE REASSESS COMMENT FT1
Pt. mental status unchanged. Respirations even and unlabored. Pt. 15 minutes into transfusion. Pt. denies, SOB, cheat pain or back pain. Pt. mental status unchanged. Respirations even and unlabored. Pt. 15 minutes into transfusion. Pt. denies, SOB, cheat pain or back pain. Pt. to go to MICU. Pt. mental status unchanged. Respirations even and unlabored. Pt. 15 minutes into transfusion. Pt. denies, SOB, chest pain or back pain. Pt. to go to MICU.

## 2019-09-19 NOTE — ED ADULT NURSE REASSESSMENT NOTE - NS ED NURSE REASSESS COMMENT FT1
Pt. mental status unchanged. Respirations even and unlabored. Pt. received Fentanyl for pain. MD Harrison from GI here to evaluation patient. Pt. mental status unchanged. Respirations even and unlabored.  Pt. complaining of upper abd pain. MD Arredondo made aware. Pt. lung sounds clear. Pt. received Fentanyl for pain. MD Harrison from GI here to evaluation patient. Blood transfusion in progress. Patient tolerating transfusion.

## 2019-09-19 NOTE — H&P ADULT - NSICDXFAMILYHX_GEN_ALL_CORE_FT
FAMILY HISTORY:  Family history of Alzheimer's disease, mother  at 93 complications of alzheimers  Family history of coronary artery disease, father  at 52 from MI  Family history of lung cancer, son- small cell carcinoma  at 43 y/o    Child  Still living? No  Family history of Hodgkin's lymphoma, Age at diagnosis: Age Unknown

## 2019-09-19 NOTE — ED PROVIDER NOTE - PHYSICAL EXAMINATION
Const: Awake, alert and oriented. In no acute distress. Very pale.  HEENT: NC/AT. Moist mucous membranes. Pale mucous membranes.  Eyes: No scleral icterus. EOMI.  Neck:. Soft and supple. Full ROM without pain.  Cardiac: Regular rate and regular rhythm. +S1/S2. No murmurs. Peripheral pulses 2+ and symmetric. No LE edema.  Resp: Speaking in full sentences. No evidence of respiratory distress. No wheezes, rales or rhonchi.  Abd: Soft, non-tender, non-distended. Normal bowel sounds in all 4 quadrants. No guarding or rebound.  Back: Spine midline and non-tender. No CVAT.  Skin: Pale. No diaphoresis. No rashes, abrasions or lacerations.  Neuro: Awake, alert & oriented x 3. Moves all extremities symmetrically.

## 2019-09-19 NOTE — ED ADULT NURSE REASSESSMENT NOTE - NS ED NURSE REASSESS COMMENT FT1
Pt ordered to received 2 units PRBC. Pt educated on blood transfusion and blood transfusion reaction. Pt verbalize an understanding. Central line in place, C/D/I. Skin pale, cool, dry. Transfusion initiated at 1150. Negative adverse reactions. Will continue to monitor.   ICU team at bedside at this time.

## 2019-09-20 ENCOUNTER — RESULT REVIEW (OUTPATIENT)
Age: 84
End: 2019-09-20

## 2019-09-20 DIAGNOSIS — K26.3 ACUTE DUODENAL ULCER WITHOUT HEMORRHAGE OR PERFORATION: ICD-10-CM

## 2019-09-20 LAB
ANION GAP SERPL CALC-SCNC: 12 MMOL/L — SIGNIFICANT CHANGE UP (ref 5–17)
APPEARANCE UR: ABNORMAL
BACTERIA # UR AUTO: ABNORMAL
BILIRUB UR-MCNC: NEGATIVE — SIGNIFICANT CHANGE UP
BUN SERPL-MCNC: 37 MG/DL — HIGH (ref 8–20)
CALCIUM SERPL-MCNC: 8.4 MG/DL — LOW (ref 8.6–10.2)
CHLORIDE SERPL-SCNC: 111 MMOL/L — HIGH (ref 98–107)
CO2 SERPL-SCNC: 22 MMOL/L — SIGNIFICANT CHANGE UP (ref 22–29)
COLOR SPEC: YELLOW — SIGNIFICANT CHANGE UP
CREAT SERPL-MCNC: 0.93 MG/DL — SIGNIFICANT CHANGE UP (ref 0.5–1.3)
DIFF PNL FLD: ABNORMAL
EPI CELLS # UR: SIGNIFICANT CHANGE UP
GLUCOSE SERPL-MCNC: 110 MG/DL — SIGNIFICANT CHANGE UP (ref 70–115)
GLUCOSE UR QL: NEGATIVE MG/DL — SIGNIFICANT CHANGE UP
HCT VFR BLD CALC: 22 % — LOW (ref 34.5–45)
HCT VFR BLD CALC: 22.2 % — LOW (ref 34.5–45)
HCT VFR BLD CALC: 22.6 % — LOW (ref 34.5–45)
HGB BLD-MCNC: 7.5 G/DL — LOW (ref 11.5–15.5)
HGB BLD-MCNC: 7.5 G/DL — LOW (ref 11.5–15.5)
HGB BLD-MCNC: 7.6 G/DL — LOW (ref 11.5–15.5)
INR BLD: 1.05 RATIO — SIGNIFICANT CHANGE UP (ref 0.88–1.16)
KETONES UR-MCNC: NEGATIVE — SIGNIFICANT CHANGE UP
LEUKOCYTE ESTERASE UR-ACNC: NEGATIVE — SIGNIFICANT CHANGE UP
MCHC RBC-ENTMCNC: 30.1 PG — SIGNIFICANT CHANGE UP (ref 27–34)
MCHC RBC-ENTMCNC: 30.1 PG — SIGNIFICANT CHANGE UP (ref 27–34)
MCHC RBC-ENTMCNC: 33.8 GM/DL — SIGNIFICANT CHANGE UP (ref 32–36)
MCHC RBC-ENTMCNC: 34.1 GM/DL — SIGNIFICANT CHANGE UP (ref 32–36)
MCV RBC AUTO: 88.4 FL — SIGNIFICANT CHANGE UP (ref 80–100)
MCV RBC AUTO: 89.2 FL — SIGNIFICANT CHANGE UP (ref 80–100)
NITRITE UR-MCNC: NEGATIVE — SIGNIFICANT CHANGE UP
NRBC # BLD: 2 /100 WBCS — HIGH (ref 0–0)
NRBC # BLD: 2 /100 WBCS — HIGH (ref 0–0)
PH UR: 5 — SIGNIFICANT CHANGE UP (ref 5–8)
PLATELET # BLD AUTO: 228 K/UL — SIGNIFICANT CHANGE UP (ref 150–400)
PLATELET # BLD AUTO: 236 K/UL — SIGNIFICANT CHANGE UP (ref 150–400)
POTASSIUM SERPL-MCNC: 4.2 MMOL/L — SIGNIFICANT CHANGE UP (ref 3.5–5.3)
POTASSIUM SERPL-SCNC: 4.2 MMOL/L — SIGNIFICANT CHANGE UP (ref 3.5–5.3)
PROT UR-MCNC: NEGATIVE MG/DL — SIGNIFICANT CHANGE UP
PROTHROM AB SERPL-ACNC: 12.1 SEC — SIGNIFICANT CHANGE UP (ref 10–12.9)
RBC # BLD: 2.49 M/UL — LOW (ref 3.8–5.2)
RBC # BLD: 2.49 M/UL — LOW (ref 3.8–5.2)
RBC # FLD: 14.7 % — HIGH (ref 10.3–14.5)
RBC # FLD: 15.3 % — HIGH (ref 10.3–14.5)
RBC CASTS # UR COMP ASSIST: SIGNIFICANT CHANGE UP /HPF (ref 0–4)
SODIUM SERPL-SCNC: 145 MMOL/L — SIGNIFICANT CHANGE UP (ref 135–145)
SP GR SPEC: 1.02 — SIGNIFICANT CHANGE UP (ref 1.01–1.02)
UROBILINOGEN FLD QL: NEGATIVE MG/DL — SIGNIFICANT CHANGE UP
WBC # BLD: 15.39 K/UL — HIGH (ref 3.8–10.5)
WBC # BLD: 15.49 K/UL — HIGH (ref 3.8–10.5)
WBC # FLD AUTO: 15.39 K/UL — HIGH (ref 3.8–10.5)
WBC # FLD AUTO: 15.49 K/UL — HIGH (ref 3.8–10.5)
WBC UR QL: NEGATIVE — SIGNIFICANT CHANGE UP

## 2019-09-20 PROCEDURE — 43227 ESOPHAGOSCOPY CONTROL BLEED: CPT

## 2019-09-20 PROCEDURE — 99233 SBSQ HOSP IP/OBS HIGH 50: CPT

## 2019-09-20 PROCEDURE — 88342 IMHCHEM/IMCYTCHM 1ST ANTB: CPT | Mod: 26

## 2019-09-20 PROCEDURE — 88305 TISSUE EXAM BY PATHOLOGIST: CPT | Mod: 26

## 2019-09-20 PROCEDURE — 99232 SBSQ HOSP IP/OBS MODERATE 35: CPT

## 2019-09-20 RX ORDER — PANTOPRAZOLE SODIUM 20 MG/1
40 TABLET, DELAYED RELEASE ORAL
Refills: 0 | Status: DISCONTINUED | OUTPATIENT
Start: 2019-09-20 | End: 2019-09-23

## 2019-09-20 RX ORDER — FUROSEMIDE 40 MG
10 TABLET ORAL DAILY
Refills: 0 | Status: DISCONTINUED | OUTPATIENT
Start: 2019-09-20 | End: 2019-09-21

## 2019-09-20 RX ORDER — SIMVASTATIN 20 MG/1
20 TABLET, FILM COATED ORAL AT BEDTIME
Refills: 0 | Status: DISCONTINUED | OUTPATIENT
Start: 2019-09-20 | End: 2019-09-23

## 2019-09-20 RX ADMIN — PANTOPRAZOLE SODIUM 10 MG/HR: 20 TABLET, DELAYED RELEASE ORAL at 16:21

## 2019-09-20 RX ADMIN — PANTOPRAZOLE SODIUM 10 MG/HR: 20 TABLET, DELAYED RELEASE ORAL at 03:47

## 2019-09-20 RX ADMIN — SIMVASTATIN 20 MILLIGRAM(S): 20 TABLET, FILM COATED ORAL at 21:44

## 2019-09-20 RX ADMIN — CHLORHEXIDINE GLUCONATE 1 APPLICATION(S): 213 SOLUTION TOPICAL at 05:56

## 2019-09-20 NOTE — DIETITIAN INITIAL EVALUATION ADULT. - ETIOLOGY
related to inability to meet sufficient protein-energy in setting of advanced age, altered GI function secondary to GI bleed, and advanced age with decreased appetite

## 2019-09-20 NOTE — CHART NOTE - NSCHARTNOTEFT_GEN_A_CORE
Upon Nutritional Assessment by the Registered Dietitian your patient was determined to meet criteria / has evidence of the following diagnosis/diagnoses:          [ ]  Mild Protein Calorie Malnutrition        [ ]  Moderate Protein Calorie Malnutrition        [x] Severe Protein Calorie Malnutrition        [ ] Unspecified Protein Calorie Malnutrition        [ ] Underweight / BMI <19        [ ] Morbid Obesity / BMI > 40    Pt presents at high nutrition risk secondary to malnutrition (severe, acute) related to inability to meet sufficient protein-energy in setting of advanced age, altered GI function secondary to GI bleed, and advanced age with decreased appetite as evidenced by meeting <75% nutrient needs >7 days, moderate muscle loss of temples and shoulders, severe muscle loss of clavicles, moderate fat loss of orbitals and triceps, and 8.1% wt loss x 3 weeks.    Findings as based on:  •  Comprehensive nutrition assessment and consultation  •  Calorie counts (nutrient intake analysis)  •  Food acceptance and intake status from observations by staff  •  Follow up  •  Patient education  •  Intervention secondary to interdisciplinary rounds  •   concerns      Treatment:    The following diet has been recommended:    1) Advance diet as medically feasible/tolerable and order Ensure Enlive TID to optimize po intake and provide an additional 350 kcal, 20g protein per serving.  2) Rx: MVI daily.  3) Obtain daily weights to monitor trends.       PROVIDER Section:     By signing this assessment you are acknowledging and agree with the diagnosis/diagnoses assigned by the Registered Dietitian    Comments:

## 2019-09-20 NOTE — BRIEF OPERATIVE NOTE - OPERATION/FINDINGS
Esophagus - there is a 6 cm hiatal hernia. There was a schazki's ring which was wide open. After retroflexion , there was a small mucosal tear at the Ge junction. Mild oozing. The oozing stopped with 2 cc epinephrine. The stomach was other wise normal. D1 normal. D2, at the duodenal sweep, there was a 1 cm white based ulcer. Small central stain. No vessel or active bleeding. D2 otherwise bleeding. Random biopsies taken of antrum and body  to r/o HP

## 2019-09-20 NOTE — BRIEF OPERATIVE NOTE - NSICDXBRIEFPOSTOP_GEN_ALL_CORE_FT
POST-OP DIAGNOSIS:  Schatzki's ring 20-Sep-2019 16:13:03  Ene Ritchie  Hiatal hernia 20-Sep-2019 16:12:56  Ene Ritchie  Acute duodenal ulcer 20-Sep-2019 16:12:47  Ene Ritchie

## 2019-09-20 NOTE — DIETITIAN INITIAL EVALUATION ADULT. - PHYSICAL APPEARANCE
other (specify)/BMI 26.8 (based on current reported weight of 124 lbs) No edema or skin breakdown documented

## 2019-09-20 NOTE — CHART NOTE - NSCHARTNOTEFT_GEN_A_CORE
EGD performed. Shows nonbleeding duodenal ulcer, minor mucosal tear at GE junction injected with epi with hemostasis. H/H have been stable. patient is stable for transfer to telemetry. I restarted patients home doses of simvistatin, lasix, and enalapril which were on hold due to NPO status/hypotension on admission. She has now been normotensive. I held Coreg for now, would restart tomorrow if bp remains stable after addition of prior mentioned medications. Started on clear liquid diet. Advance per GI. Awaiting sign out to Dr. Velazquez.

## 2019-09-20 NOTE — DIETITIAN INITIAL EVALUATION ADULT. - MALNUTRITION
NFPE: moderate muscle loss of temples and shoulders, severe muscle loss of clavicles, and moderate fat loss of orbitals and triceps severe, acute

## 2019-09-20 NOTE — DIETITIAN INITIAL EVALUATION ADULT. - ADD RECOMMEND
Advance diet as medically feasible/tolerable and order Ensure Enlive TID to optimize po intake and provide an additional 350 kcal, 20g protein per serving. Rx: MVI daily. Obtain daily weights to monitor trends.

## 2019-09-20 NOTE — PROGRESS NOTE ADULT - SUBJECTIVE AND OBJECTIVE BOX
Patient: MICHAEL FOSTER [id number] 87y Female                           Internal Medicine Hospitalist Progress Note    Initial HPI:  88 y/o female with PMH of CAD s/p CABG, arthritis, hiatal hernia presented to [facility] for 2 days history of black stools and dark vomitus. On arrival to the ER found to be hypotensive to 80s, tachy to 110. Hgb found to be 4. CTA abdomen/pelvis without etiology of bleeding. Admitted to MICU for hemorrhagic shock.  EGD showed 6 cm hiatal hernia, schazki's ring, small mucosal tear at the Ge junction, injected with epi.  Also noted duodenal ulcer.  Transferred to hospitalist service.     Interval History:  No complaints.  No Abdominal pain, nausea, vomiting, diarrhea, nor constipation. No bleeding.  No additional complaints.  ____________________PHYSICAL EXAM:  Vitals reviewed as indicated below  GENERAL:  NAD Alert and Oriented x 3 Pleasant   HEENT: NCAT  CARDIOVASCULAR:  S1, S2  LUNGS: CTAB  ABDOMEN:  soft, (-) tenderness, (-) distension, (+) bowel sounds, (-) guarding, (-) rebound (-) rigidity  EXTREMITIES:  no cyanosis / clubbing / edema.   ____________________      BACKGROUND:  HEALTH ISSUES - PROBLEM Dx:  Acute duodenal ulcer  Gastrointestinal hemorrhage with melena: Gastrointestinal hemorrhage with melena        Allergies    No Known Allergies    Intolerances      PAST MEDICAL & SURGICAL HISTORY:  Drop foot gait: left foot  Disc disorder of lumbar region  Hernia: hiatal, gastric hernias  Hernia, ventral  Rotator cuff (capsule) sprain  HLD (hyperlipidemia)  HTN (hypertension)  CAD (coronary artery disease)  Arthritis  S/P discectomy: lumbar-   Gastric disorder: gastric hernia repair- 3/16/12  S/P CABG x 3: 11  S/P arthroscopy of shoulder: right rotator cuff repair-   S/P arthroscopy of shoulder: - left rotator cuff repair  S/P hysterectomy:         VITALS:  Vital Signs Last 24 Hrs  T(C): 37 (20 Sep 2019 16:01), Max: 37.2 (20 Sep 2019 04:34)  T(F): 98.6 (20 Sep 2019 16:01), Max: 98.9 (20 Sep 2019 04:34)  HR: 82 (20 Sep 2019 18:00) (69 - 107)  BP: 98/55 (20 Sep 2019 18:00) (93/60 - 142/71)  BP(mean): 74 (20 Sep 2019 18:00) (70 - 100)  RR: 22 (20 Sep 2019 18:00) (13 - 32)  SpO2: 91% (20 Sep 2019 18:00) (84% - 100%) Daily     Daily Weight in k.2 (20 Sep 2019 11:07)  CAPILLARY BLOOD GLUCOSE        I&O's Summary    19 Sep 2019 07:01  -  20 Sep 2019 07:00  --------------------------------------------------------  IN: 1192 mL / OUT: 400 mL / NET: 792 mL    20 Sep 2019 07:01  -  20 Sep 2019 19:07  --------------------------------------------------------  IN: 80 mL / OUT: 0 mL / NET: 80 mL          LABS:                        7.6    x     )-----------( x        ( 20 Sep 2019 18:28 )             22.6     09-20    145  |  111<H>  |  37.0<H>  ----------------------------<  110  4.2   |  22.0  |  0.93    Ca    8.4<L>      20 Sep 2019 05:26    TPro  5.1<L>  /  Alb  3.2<L>  /  TBili  <0.2<L>  /  DBili  x   /  AST  16  /  ALT  17  /  AlkPhos  39<L>      PT/INR - ( 20 Sep 2019 05:26 )   PT: 12.1 sec;   INR: 1.05 ratio         PTT - ( 19 Sep 2019 08:50 )  PTT:24.3 sec  LIVER FUNCTIONS - ( 19 Sep 2019 08:50 )  Alb: 3.2 g/dL / Pro: 5.1 g/dL / ALK PHOS: 39 U/L / ALT: 17 U/L / AST: 16 U/L / GGT: x           Urinalysis Basic - ( 20 Sep 2019 18:30 )    Color: Yellow / Appearance: Slightly Turbid / S.020 / pH: x  Gluc: x / Ketone: Negative  / Bili: Negative / Urobili: Negative mg/dL   Blood: x / Protein: Negative mg/dL / Nitrite: Negative   Leuk Esterase: Negative / RBC: 0-2 /HPF / WBC Negative   Sq Epi: x / Non Sq Epi: Occasional / Bacteria: Moderate      CARDIAC MARKERS ( 19 Sep 2019 08:50 )  x     / <0.01 ng/mL / x     / x     / x              MEDICATIONS:  MEDICATIONS  (STANDING):  enalapril 2.5 milliGRAM(s) Oral daily  furosemide    Tablet 10 milliGRAM(s) Oral daily  pantoprazole    Tablet 40 milliGRAM(s) Oral two times a day  simvastatin 20 milliGRAM(s) Oral at bedtime    MEDICATIONS  (PRN):

## 2019-09-20 NOTE — PROGRESS NOTE ADULT - SUBJECTIVE AND OBJECTIVE BOX
Patient is a 87y old  Female who presents with a chief complaint of GI bleed (19 Sep 2019 12:50)    BRIEF HOSPITAL COURSE:     87F w/ PMHx CAD s/p CABG, hiatal hernia presents to ED w/ melanotic stools for the past 2 days. Also gives h/o N/V, poor appetite and recent weight loss. Pt was hypotensive, tachycardic and initial labs revealed Hb 4.1. Fluid resuscitated w/ 2L and RIJ TLC inserted. Currently getting pRBC transfusion w/ improvement in hemodynamics. On aspirin 325mg QD, not on any other AC    Events last 24 hours:     Pt was transfused 3U pRBC since admission. No obvious bleed documented. No pain. Afebrile. NPO since midnight. Sitting up in chair    PAST MEDICAL & SURGICAL HISTORY:  Drop foot gait: left foot  Disc disorder of lumbar region  Hernia: hiatal, gastric hernias  Hernia, ventral  Rotator cuff (capsule) sprain  HLD (hyperlipidemia)  HTN (hypertension)  CAD (coronary artery disease)  Arthritis  S/P discectomy: lumbar- 1996  Gastric disorder: gastric hernia repair- 3/16/12  S/P CABG x 3: 8/18/11  S/P arthroscopy of shoulder: right rotator cuff repair- 2006  S/P arthroscopy of shoulder: 2001- left rotator cuff repair  S/P hysterectomy: 1972      Review of Systems:  CONSTITUTIONAL: No fever, chills, or fatigue  EYES: No eye pain, visual disturbances, or discharge  ENMT:  No difficulty hearing, tinnitus, vertigo; No sinus or throat pain  NECK: No pain or stiffness  RESPIRATORY: No cough, wheezing, chills or hemoptysis; No shortness of breath  CARDIOVASCULAR: No chest pain, palpitations, dizziness, or leg swelling  GASTROINTESTINAL: No abdominal or epigastric pain. No nausea, vomiting, or hematemesis; No diarrhea or constipation. No melena or hematochezia.  GENITOURINARY: No dysuria, frequency, hematuria, or incontinence  NEUROLOGICAL: No headaches, memory loss, loss of strength, numbness, or tremors  SKIN: No itching, burning, rashes, or lesions   MUSCULOSKELETAL: No joint pain or swelling; No muscle, back, or extremity pain  PSYCHIATRIC: No depression, anxiety, mood swings, or difficulty sleeping      Physical Examination:    ICU Vital Signs Last 24 Hrs  T(C): 36.5 (20 Sep 2019 12:33), Max: 37.2 (20 Sep 2019 04:34)  T(F): 97.7 (20 Sep 2019 12:33), Max: 98.9 (20 Sep 2019 04:34)  HR: 69 (20 Sep 2019 10:00) (69 - 102)  BP: 106/57 (20 Sep 2019 10:00) (101/55 - 136/63)  BP(mean): 76 (20 Sep 2019 10:00) (73 - 100)  ABP: --  ABP(mean): --  RR: 19 (20 Sep 2019 10:00) (13 - 31)  SpO2: 100% (20 Sep 2019 10:00) (89% - 100%)      General: No acute distress.      Neuro: AAO*3, No motor, sensory, or cranial nerve deficit    HEENT: Pupils equal, reactive to light, Moist oral mucosa    PULM: Clear to auscultation bilaterally, no significant adventitious breath sounds     CVS: Regular rhythm and controlled rate, no murmurs, rubs, or gallops    ABD: Soft, nondistended, nontender, normoactive bowel sounds    EXT: No b/l LE edema, nontender with pedal pulse palpable     SKIN: Warm and well perfused, no acute rashes       Medications:              pantoprazole Infusion 8 mG/Hr IV Continuous <Continuous>            chlorhexidine 2% Cloths 1 Application(s) Topical <User Schedule>            I&O's Detail    19 Sep 2019 07:01  -  20 Sep 2019 07:00  --------------------------------------------------------  IN:    Packed Red Blood Cells: 992 mL    pantoprazole Infusion: 200 mL  Total IN: 1192 mL    OUT:    Voided: 400 mL  Total OUT: 400 mL    Total NET: 792 mL            RADIOLOGY/ Microbiology/ Labs: reviewed

## 2019-09-20 NOTE — BRIEF OPERATIVE NOTE - COMMENTS
A/P  Schatzki's ring, ( small mucosal tear with retroflexion) - quickly stopped with epinephrine  hiatal hernia  DU- non bleeding, white based  - start clear liquids  PPI  BID  may transfer to medical floor

## 2019-09-20 NOTE — DIETITIAN INITIAL EVALUATION ADULT. - PERTINENT LABORATORY DATA
09-20 Na145 mmol/L Glu 110 mg/dL K+ 4.2 mmol/L Cr  0.93 mg/dL BUN 37.0 mg/dL<H> Phos n/a   Alb n/a   PAB n/a

## 2019-09-21 LAB
ANION GAP SERPL CALC-SCNC: 9 MMOL/L — SIGNIFICANT CHANGE UP (ref 5–17)
BUN SERPL-MCNC: 21 MG/DL — HIGH (ref 8–20)
CALCIUM SERPL-MCNC: 8.1 MG/DL — LOW (ref 8.6–10.2)
CHLORIDE SERPL-SCNC: 110 MMOL/L — HIGH (ref 98–107)
CO2 SERPL-SCNC: 22 MMOL/L — SIGNIFICANT CHANGE UP (ref 22–29)
CREAT SERPL-MCNC: 0.78 MG/DL — SIGNIFICANT CHANGE UP (ref 0.5–1.3)
GLUCOSE SERPL-MCNC: 102 MG/DL — SIGNIFICANT CHANGE UP (ref 70–115)
HCT VFR BLD CALC: 22.8 % — LOW (ref 34.5–45)
HGB BLD-MCNC: 7.6 G/DL — LOW (ref 11.5–15.5)
MAGNESIUM SERPL-MCNC: 2.1 MG/DL — SIGNIFICANT CHANGE UP (ref 1.6–2.6)
MCHC RBC-ENTMCNC: 30.6 PG — SIGNIFICANT CHANGE UP (ref 27–34)
MCHC RBC-ENTMCNC: 33.3 GM/DL — SIGNIFICANT CHANGE UP (ref 32–36)
MCV RBC AUTO: 91.9 FL — SIGNIFICANT CHANGE UP (ref 80–100)
NRBC # BLD: 2 /100 WBCS — HIGH (ref 0–0)
PHOSPHATE SERPL-MCNC: 2.9 MG/DL — SIGNIFICANT CHANGE UP (ref 2.4–4.7)
PLATELET # BLD AUTO: 211 K/UL — SIGNIFICANT CHANGE UP (ref 150–400)
POTASSIUM SERPL-MCNC: 3.7 MMOL/L — SIGNIFICANT CHANGE UP (ref 3.5–5.3)
POTASSIUM SERPL-SCNC: 3.7 MMOL/L — SIGNIFICANT CHANGE UP (ref 3.5–5.3)
RBC # BLD: 2.48 M/UL — LOW (ref 3.8–5.2)
RBC # FLD: 16.4 % — HIGH (ref 10.3–14.5)
SODIUM SERPL-SCNC: 141 MMOL/L — SIGNIFICANT CHANGE UP (ref 135–145)
WBC # BLD: 13.89 K/UL — HIGH (ref 3.8–10.5)
WBC # FLD AUTO: 13.89 K/UL — HIGH (ref 3.8–10.5)

## 2019-09-21 PROCEDURE — 99233 SBSQ HOSP IP/OBS HIGH 50: CPT

## 2019-09-21 RX ORDER — ACETAMINOPHEN 500 MG
650 TABLET ORAL EVERY 6 HOURS
Refills: 0 | Status: DISCONTINUED | OUTPATIENT
Start: 2019-09-21 | End: 2019-09-23

## 2019-09-21 RX ORDER — FERROUS SULFATE 325(65) MG
325 TABLET ORAL DAILY
Refills: 0 | Status: DISCONTINUED | OUTPATIENT
Start: 2019-09-21 | End: 2019-09-23

## 2019-09-21 RX ORDER — SODIUM CHLORIDE 9 MG/ML
1000 INJECTION INTRAMUSCULAR; INTRAVENOUS; SUBCUTANEOUS ONCE
Refills: 0 | Status: COMPLETED | OUTPATIENT
Start: 2019-09-21 | End: 2019-09-21

## 2019-09-21 RX ADMIN — PANTOPRAZOLE SODIUM 40 MILLIGRAM(S): 20 TABLET, DELAYED RELEASE ORAL at 06:23

## 2019-09-21 RX ADMIN — PANTOPRAZOLE SODIUM 40 MILLIGRAM(S): 20 TABLET, DELAYED RELEASE ORAL at 17:13

## 2019-09-21 RX ADMIN — Medication 325 MILLIGRAM(S): at 11:13

## 2019-09-21 RX ADMIN — SIMVASTATIN 20 MILLIGRAM(S): 20 TABLET, FILM COATED ORAL at 21:26

## 2019-09-21 RX ADMIN — SODIUM CHLORIDE 1000 MILLILITER(S): 9 INJECTION INTRAMUSCULAR; INTRAVENOUS; SUBCUTANEOUS at 04:30

## 2019-09-21 NOTE — PROGRESS NOTE ADULT - SUBJECTIVE AND OBJECTIVE BOX
Patient is a 87y old  Female who presents with a chief complaint of GI bleed (21 Sep 2019 12:18)      HPI:  88 y/o female with PMH of CAD s/p CABG, arthritis, hiatal hernia. Patient found to have DU , non bleeding on EGD yesterday. TOleratiing full liquid diet. Hgb stable at 7.6No abdominal pain    REVIEW OF SYSTEMS:  Constitutional: No fever, weight loss or fatigue  ENMT:  No difficulty hearing, tinnitus, vertigo; No sinus or throat pain  Respiratory: No cough, wheezing, chills or hemoptysis  Cardiovascular: No chest pain, palpitations, dizziness or leg swelling  Gastrointestinal: No abdominal or epigastric pain. No nausea, vomiting or hematemesis; No diarrhea or constipation. No melena or hematochezia.  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  Family history of Alzheimer's disease: mother  at 93 complications of alzheimers  Family history of coronary artery disease: father  at 52 from MI      SOCIAL HISTORY:  Smoking Status: [ ] Current, [ ] Former, [ ] Never  Pack Years:  [  ] EtOH  [  ] IVDA    MEDICATIONS:  MEDICATIONS  (STANDING):  enalapril 2.5 milliGRAM(s) Oral daily  ferrous    sulfate 325 milliGRAM(s) Oral daily  pantoprazole    Tablet 40 milliGRAM(s) Oral two times a day  simvastatin 20 milliGRAM(s) Oral at bedtime    MEDICATIONS  (PRN):  acetaminophen   Tablet .. 650 milliGRAM(s) Oral every 6 hours PRN Temp greater or equal to 38C (100.4F), Mild Pain (1 - 3)      Allergies    No Known Allergies    Intolerances        Vital Signs Last 24 Hrs  T(C): 36.9 (21 Sep 2019 16:06), Max: 37.5 (20 Sep 2019 23:49)  T(F): 98.5 (21 Sep 2019 16:06), Max: 99.5 (20 Sep 2019 23:49)  HR: 73 (21 Sep 2019 17:00) (73 - 95)  BP: 102/57 (21 Sep 2019 17:00) (84/43 - 109/52)  BP(mean): 75 (21 Sep 2019 16:00) (61 - 77)  RR: 17 (21 Sep 2019 17:00) (17 - 40)  SpO2: 93% (21 Sep 2019 17:00) (93% - 100%)     @ 07:01  -   @ 07:00  --------------------------------------------------------  IN: 1080 mL / OUT: 300 mL / NET: 780 mL     @ 07:01  -   @ 18:03  --------------------------------------------------------  IN: 200 mL / OUT: 0 mL / NET: 200 mL          PHYSICAL EXAM:    General: Well developed; well nourished; in no acute distress  HEENT: MMM, conjunctiva and sclera clear  H- RRR  L- CTA  Gastrointestinal: Soft, non-tender non-distended; Normal bowel sounds; No rebound or guarding  Extremities: Normal range of motion, No clubbing, cyanosis or edema  Neurological: Alert and oriented x3  Skin: Warm and dry. No obvious rash      LABS:                        7.6    13.89 )-----------( 211      ( 21 Sep 2019 03:59 )             22.8     21 Sep 2019 03:59    141    |  110    |  21.0   ----------------------------<  102    3.7     |  22.0   |  0.78     Ca    8.1        21 Sep 2019 03:59  Phos  2.9       21 Sep 2019 03:59  Mg     2.1       21 Sep 2019 03:59                RADIOLOGY & ADDITIONAL STUDIES:

## 2019-09-21 NOTE — CHART NOTE - NSCHARTNOTEFT_GEN_A_CORE
Notified by nursing BP 82/45 (MAP 57)    Patient denies dizziness when sitting up or supine, denies CP/paliptations, dyspnea, headaches, N/V, abd pain at this time. Offers no complaints    PHYSICAL EXAM:    Vital Signs Last 24 Hrs  T(C): 37.1 (21 Sep 2019 03:30), Max: 37.5 (20 Sep 2019 23:49)  T(F): 98.8 (21 Sep 2019 03:30), Max: 99.5 (20 Sep 2019 23:49)  HR: 88  BP: 82/45  RR: 22  SpO2: 95%    GENERAL: Pt easily aroused, lying comfortably, NAD.  NECK: soft, Supple, No JVD,   CHEST/LUNG: Clear to auscultation bilaterally; No wheezing.  HEART: S1S2+, Regular rate and rhythm; No murmurs appreciated  ABDOMEN: Soft, Nontender, Nondistended; Bowel sounds present.  EXT: no edema, brisk cap refill  SKIN: cool, dry  NEURO: AAOX3, no focal deficits appreciated    MEDICATIONS  (STANDING):  enalapril 2.5 milliGRAM(s) Oral daily  furosemide    Tablet 10 milliGRAM(s) Oral daily  pantoprazole    Tablet 40 milliGRAM(s) Oral two times a day  simvastatin 20 milliGRAM(s) Oral at bedtime    MEDICATIONS  (PRN):  none    LABS:                        7.6    13.89 )-----------( 211      ( 21 Sep 2019 03:59 )             22.8     Hemoglobin: 7.6 g/dL (09-21 @ 03:59)  Hemoglobin: 7.6 g/dL (09-20 @ 18:28)  Hemoglobin: 7.5 g/dL (09-20 @ 10:31)  Hemoglobin: 7.5 g/dL (09-20 @ 05:26)  Hemoglobin: 7.9 g/dL (09-19 @ 21:26)      09-20    145  |  111<H>  |  37.0<H>  ----------------------------<  110  4.2   |  22.0  |  0.93    Ca    8.4<L>      20 Sep 2019 05:26    TPro  5.1<L>  /  Alb  3.2<L>  /  TBili  <0.2<L>  /  DBili  x   /  AST  16  /  ALT  17  /  AlkPhos  39<L>  09-19    PT/INR - ( 20 Sep 2019 05:26 )   PT: 12.1 sec;   INR: 1.05 ratio    PTT - ( 19 Sep 2019 08:50 )  PTT:24.3 sec    A/P  88 y/o female with PMH of CAD s/p CABG, arthritis, hiatal hernia presented to [facility] for 2 days history of black stools and dark vomitus. On arrival to the ER found to be hypotensive to 80s, tachy to 110. Hgb found to be 4. CTA abdomen/pelvis without etiology of bleeding. Admitted to MICU for hemorrhagic shock.  EGD showed 6 cm hiatal hernia, Schatzki's ring, small mucosal tear at the Ge junction, injected with epi.  Also noted duodenal ulcer.  Transferred to hospitalist service.     # Hypotension, asymptomatic at this time  - Fluid bolus now  - hold AM enalapril and furosemide  - H/H stable from yesterday  - reviewed w RN    Repeat BP during fluid bolus 102/52 @0129

## 2019-09-21 NOTE — PROGRESS NOTE ADULT - SUBJECTIVE AND OBJECTIVE BOX
MICHAEL FOSTER    [id number]    87y      Female    INTERVAL HPI/OVERNIGHT EVENTS:    patient being seen for GI bleed. Patient seen at bedside with grandson. Patient states feeling well.     REVIEW OF SYSTEMS:    CONSTITUTIONAL: No fever, weight loss, or fatigue  RESPIRATORY: No cough, wheezing, hemoptysis; No shortness of breath  CARDIOVASCULAR: No chest pain, palpitations  GASTROINTESTINAL: No abdominal or epigastric pain. No nausea, vomiting  NEUROLOGICAL: No headaches, memory loss, loss of strength.  MISCELLANEOUS:      Vital Signs Last 24 Hrs  T(C): 36.9 (21 Sep 2019 08:49), Max: 37.5 (20 Sep 2019 23:49)  T(F): 98.5 (21 Sep 2019 08:49), Max: 99.5 (20 Sep 2019 23:49)  HR: 84 (21 Sep 2019 11:00) (69 - 107)  BP: 96/54 (21 Sep 2019 11:00) (84/43 - 142/71)  BP(mean): 71 (21 Sep 2019 11:00) (61 - 100)  RR: 20 (21 Sep 2019 11:00) (14 - 40)  SpO2: 100% (21 Sep 2019 11:00) (84% - 100%)    PHYSICAL EXAM:    GENERAL:  NAD Alert and Oriented x 3 Pleasant   HEENT: NCAT  CARDIOVASCULAR:  S1, S2  LUNGS: CTAB  ABDOMEN:  soft, (-) tenderness, (-) distension, (+) bowel sounds, (-) guarding, (-) rebound (-) rigidity  EXTREMITIES:  no cyanosis / clubbing / edema.       LABS:                        7.6    13.89 )-----------( 211      ( 21 Sep 2019 03:59 )             22.8     09-21    141  |  110<H>  |  21.0<H>  ----------------------------<  102  3.7   |  22.0  |  0.78    Ca    8.1<L>      21 Sep 2019 03:59  Phos  2.9       Mg     2.1     -      PT/INR - ( 20 Sep 2019 05:26 )   PT: 12.1 sec;   INR: 1.05 ratio           Urinalysis Basic - ( 20 Sep 2019 18:30 )    Color: Yellow / Appearance: Slightly Turbid / S.020 / pH: x  Gluc: x / Ketone: Negative  / Bili: Negative / Urobili: Negative mg/dL   Blood: x / Protein: Negative mg/dL / Nitrite: Negative   Leuk Esterase: Negative / RBC: 0-2 /HPF / WBC Negative   Sq Epi: x / Non Sq Epi: Occasional / Bacteria: Moderate          MEDICATIONS  (STANDING):  enalapril 2.5 milliGRAM(s) Oral daily  ferrous    sulfate 325 milliGRAM(s) Oral daily  pantoprazole    Tablet 40 milliGRAM(s) Oral two times a day  simvastatin 20 milliGRAM(s) Oral at bedtime    MEDICATIONS  (PRN):  acetaminophen   Tablet .. 650 milliGRAM(s) Oral every 6 hours PRN Temp greater or equal to 38C (100.4F), Mild Pain (1 - 3)      RADIOLOGY & ADDITIONAL TESTS:

## 2019-09-22 DIAGNOSIS — K92.2 GASTROINTESTINAL HEMORRHAGE, UNSPECIFIED: ICD-10-CM

## 2019-09-22 LAB
HCT VFR BLD CALC: 24.4 % — LOW (ref 34.5–45)
HGB BLD-MCNC: 7.8 G/DL — LOW (ref 11.5–15.5)
MCHC RBC-ENTMCNC: 30.2 PG — SIGNIFICANT CHANGE UP (ref 27–34)
MCHC RBC-ENTMCNC: 32 GM/DL — SIGNIFICANT CHANGE UP (ref 32–36)
MCV RBC AUTO: 94.6 FL — SIGNIFICANT CHANGE UP (ref 80–100)
PLATELET # BLD AUTO: 227 K/UL — SIGNIFICANT CHANGE UP (ref 150–400)
RBC # BLD: 2.58 M/UL — LOW (ref 3.8–5.2)
RBC # FLD: 17.6 % — HIGH (ref 10.3–14.5)
WBC # BLD: 7.74 K/UL — SIGNIFICANT CHANGE UP (ref 3.8–10.5)
WBC # FLD AUTO: 7.74 K/UL — SIGNIFICANT CHANGE UP (ref 3.8–10.5)

## 2019-09-22 PROCEDURE — 99232 SBSQ HOSP IP/OBS MODERATE 35: CPT

## 2019-09-22 RX ADMIN — Medication 650 MILLIGRAM(S): at 05:09

## 2019-09-22 RX ADMIN — Medication 650 MILLIGRAM(S): at 21:13

## 2019-09-22 RX ADMIN — Medication 325 MILLIGRAM(S): at 12:23

## 2019-09-22 RX ADMIN — PANTOPRAZOLE SODIUM 40 MILLIGRAM(S): 20 TABLET, DELAYED RELEASE ORAL at 17:10

## 2019-09-22 RX ADMIN — SIMVASTATIN 20 MILLIGRAM(S): 20 TABLET, FILM COATED ORAL at 21:13

## 2019-09-22 RX ADMIN — Medication 650 MILLIGRAM(S): at 22:02

## 2019-09-22 RX ADMIN — Medication 2.5 MILLIGRAM(S): at 05:09

## 2019-09-22 RX ADMIN — PANTOPRAZOLE SODIUM 40 MILLIGRAM(S): 20 TABLET, DELAYED RELEASE ORAL at 05:09

## 2019-09-22 RX ADMIN — Medication 650 MILLIGRAM(S): at 06:12

## 2019-09-22 NOTE — PROGRESS NOTE ADULT - SUBJECTIVE AND OBJECTIVE BOX
Patient is a 87y old  Female who presents with a chief complaint of GI bleed (21 Sep 2019 18:03)      HPI:  88 y/o female with PMH of CAD s/p CABG, . Patient had mild epigastric soreness this am. Tolerated full liquid diet. No vomiting. Hgb stable in the mid 7 range.         REVIEW OF SYSTEMS:  Constitutional: No fever, weight loss or fatigue  ENMT:  No difficulty hearing, tinnitus, vertigo; No sinus or throat pain  Respiratory: No cough, wheezing, chills or hemoptysis  Cardiovascular: No chest pain, palpitations, dizziness or leg swelling  Gastrointestinal: +epigastric pain. No nausea, vomiting or hematemesis; No diarrhea or constipation. No melena or hematochezia.  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  Family history of Alzheimer's disease: mother  at 93 complications of alzheimers  Family history of coronary artery disease: father  at 52 from MI      SOCIAL HISTORY:  Smoking Status: [ ] Current, [ ] Former, [ ] Never  Pack Years:  [  ] EtOH-no  [  ] IVDA    MEDICATIONS:  MEDICATIONS  (STANDING):  enalapril 2.5 milliGRAM(s) Oral daily  ferrous    sulfate 325 milliGRAM(s) Oral daily  pantoprazole    Tablet 40 milliGRAM(s) Oral two times a day  simvastatin 20 milliGRAM(s) Oral at bedtime    MEDICATIONS  (PRN):  acetaminophen   Tablet .. 650 milliGRAM(s) Oral every 6 hours PRN Temp greater or equal to 38C (100.4F), Mild Pain (1 - 3)      Allergies    No Known Allergies    Intolerances        Vital Signs Last 24 Hrs  T(C): 36.6 (22 Sep 2019 07:38), Max: 37.2 (21 Sep 2019 21:16)  T(F): 97.8 (22 Sep 2019 07:38), Max: 98.9 (21 Sep 2019 21:16)  HR: 63 (22 Sep 2019 07:38) (63 - 84)  BP: 107/66 (22 Sep 2019 07:38) (95/56 - 111/63)  BP(mean): 72 (21 Sep 2019 20:45) (71 - 75)  RR: 18 (22 Sep 2019 07:38) (14 - 21)  SpO2: 91% (22 Sep 2019 07:38) (91% - 100%)     @ 07:01  -   @ 07:00  --------------------------------------------------------  IN: 440 mL / OUT: 0 mL / NET: 440 mL          PHYSICAL EXAM:    General: Well developed; well nourished; in no acute distress  HEENT: MMM, conjunctiva and sclera clear  H- RRR  L- CTA  Gastrointestinal: Soft, non-tender non-distended; Normal bowel sounds; No rebound or guarding  Extremities: Normal range of motion, No clubbing, cyanosis or edema  Neurological: Alert and oriented x3  Skin: Warm and dry. No obvious rash      LABS:                        7.8    7.74  )-----------( 227      ( 22 Sep 2019 06:11 )             24.4     21 Sep 2019 03:59    141    |  110    |  21.0   ----------------------------<  102    3.7     |  22.0   |  0.78     Ca    8.1        21 Sep 2019 03:59  Phos  2.9       21 Sep 2019 03:59  Mg     2.1       21 Sep 2019 03:59                RADIOLOGY & ADDITIONAL STUDIES:     < from: CT Angio Abdomen and Pelvis w/ IV Cont (19 @ 10:04) >  MPRESSION:     No evidence of active GI bleeding as described. 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   Inferior mesenteric artery not visualized.              < end of copied text >

## 2019-09-22 NOTE — PROGRESS NOTE ADULT - SUBJECTIVE AND OBJECTIVE BOX
MICHAEL FOSTER    [id number]    87y      Female    INTERVAL HPI/OVERNIGHT EVENTS:    patient being seen for hemorrhagic shock. patient seen at bedside and denies any complaints. patient to receive 1 unit prbc    REVIEW OF SYSTEMS:    CONSTITUTIONAL: No fever, weight loss, or fatigue  RESPIRATORY: No cough, wheezing, hemoptysis; No shortness of breath  CARDIOVASCULAR: No chest pain, palpitations  GASTROINTESTINAL: No abdominal or epigastric pain. No nausea, vomiting  NEUROLOGICAL: No headaches, memory loss, loss of strength.  MISCELLANEOUS:      Vital Signs Last 24 Hrs  T(C): 36.6 (22 Sep 2019 10:50), Max: 37.2 (21 Sep 2019 21:16)  T(F): 97.8 (22 Sep 2019 10:50), Max: 98.9 (21 Sep 2019 21:16)  HR: 62 (22 Sep 2019 10:50) (62 - 84)  BP: 91/53 (22 Sep 2019 10:50) (91/53 - 111/63)  BP(mean): 72 (21 Sep 2019 20:45) (72 - 75)  RR: 20 (22 Sep 2019 10:50) (14 - 21)  SpO2: 91% (22 Sep 2019 07:38) (91% - 98%)    PHYSICAL EXAM:    GENERAL:  NAD Alert and Oriented x 3 Pleasant   HEENT: NCAT  CARDIOVASCULAR:  S1, S2  LUNGS: CTAB  ABDOMEN:  soft, (-) tenderness, (-) distension, (+) bowel sounds, (-) guarding, (-) rebound (-) rigidity  EXTREMITIES:  no cyanosis / clubbing / edema.   LABS:                        7.8    7.74  )-----------( 227      ( 22 Sep 2019 06:11 )             24.4         141  |  110<H>  |  21.0<H>  ----------------------------<  102  3.7   |  22.0  |  0.78    Ca    8.1<L>      21 Sep 2019 03:59  Phos  2.9       Mg     2.1             Urinalysis Basic - ( 20 Sep 2019 18:30 )    Color: Yellow / Appearance: Slightly Turbid / S.020 / pH: x  Gluc: x / Ketone: Negative  / Bili: Negative / Urobili: Negative mg/dL   Blood: x / Protein: Negative mg/dL / Nitrite: Negative   Leuk Esterase: Negative / RBC: 0-2 /HPF / WBC Negative   Sq Epi: x / Non Sq Epi: Occasional / Bacteria: Moderate          MEDICATIONS  (STANDING):  enalapril 2.5 milliGRAM(s) Oral daily  ferrous    sulfate 325 milliGRAM(s) Oral daily  pantoprazole    Tablet 40 milliGRAM(s) Oral two times a day  simvastatin 20 milliGRAM(s) Oral at bedtime    MEDICATIONS  (PRN):  acetaminophen   Tablet .. 650 milliGRAM(s) Oral every 6 hours PRN Temp greater or equal to 38C (100.4F), Mild Pain (1 - 3)      RADIOLOGY & ADDITIONAL TESTS:

## 2019-09-23 ENCOUNTER — TRANSCRIPTION ENCOUNTER (OUTPATIENT)
Age: 84
End: 2019-09-23

## 2019-09-23 VITALS
RESPIRATION RATE: 20 BRPM | TEMPERATURE: 98 F | HEART RATE: 64 BPM | DIASTOLIC BLOOD PRESSURE: 75 MMHG | SYSTOLIC BLOOD PRESSURE: 118 MMHG

## 2019-09-23 DIAGNOSIS — D50.0 IRON DEFICIENCY ANEMIA SECONDARY TO BLOOD LOSS (CHRONIC): ICD-10-CM

## 2019-09-23 DIAGNOSIS — I25.10 ATHEROSCLEROTIC HEART DISEASE OF NATIVE CORONARY ARTERY WITHOUT ANGINA PECTORIS: ICD-10-CM

## 2019-09-23 LAB
HCT VFR BLD CALC: 29.5 % — LOW (ref 34.5–45)
HGB BLD-MCNC: 9.8 G/DL — LOW (ref 11.5–15.5)
MCHC RBC-ENTMCNC: 30.8 PG — SIGNIFICANT CHANGE UP (ref 27–34)
MCHC RBC-ENTMCNC: 33.2 GM/DL — SIGNIFICANT CHANGE UP (ref 32–36)
MCV RBC AUTO: 92.8 FL — SIGNIFICANT CHANGE UP (ref 80–100)
PLATELET # BLD AUTO: 264 K/UL — SIGNIFICANT CHANGE UP (ref 150–400)
RBC # BLD: 3.18 M/UL — LOW (ref 3.8–5.2)
RBC # FLD: 16.8 % — HIGH (ref 10.3–14.5)
WBC # BLD: 5.4 K/UL — SIGNIFICANT CHANGE UP (ref 3.8–10.5)
WBC # FLD AUTO: 5.4 K/UL — SIGNIFICANT CHANGE UP (ref 3.8–10.5)

## 2019-09-23 PROCEDURE — 99239 HOSP IP/OBS DSCHRG MGMT >30: CPT

## 2019-09-23 PROCEDURE — 99232 SBSQ HOSP IP/OBS MODERATE 35: CPT

## 2019-09-23 RX ORDER — PANTOPRAZOLE SODIUM 20 MG/1
1 TABLET, DELAYED RELEASE ORAL
Qty: 60 | Refills: 0
Start: 2019-09-23 | End: 2019-10-22

## 2019-09-23 RX ORDER — FERROUS SULFATE 325(65) MG
1 TABLET ORAL
Qty: 30 | Refills: 0
Start: 2019-09-23 | End: 2019-10-22

## 2019-09-23 RX ORDER — PANTOPRAZOLE SODIUM 20 MG/1
1 TABLET, DELAYED RELEASE ORAL
Qty: 0 | Refills: 0 | DISCHARGE
Start: 2019-09-23 | End: 2019-10-22

## 2019-09-23 RX ADMIN — Medication 2.5 MILLIGRAM(S): at 05:08

## 2019-09-23 RX ADMIN — PANTOPRAZOLE SODIUM 40 MILLIGRAM(S): 20 TABLET, DELAYED RELEASE ORAL at 05:08

## 2019-09-23 NOTE — PROGRESS NOTE ADULT - SUBJECTIVE AND OBJECTIVE BOX
Chief Complaint: This is a 87y old Female patient being seen for follow-up consultation for GIB.    HPI / 24H events:  Patient tolerating full diet.  Denies any melena or rectal bleeding.  No nausea or vomiting.  No abdominal pain.  Wants to go home.  Received one unit PRBCs yesterday with good hemoglobin response.    ROS: A 14-point review of systems was reviewed and was otherwise negative save what was reported in the HPI.    PAST MEDICAL/SURGICAL HISTORY:  Drop foot gait: left foot  Disc disorder of lumbar region  Hernia: hiatal, gastric hernias  Hernia, ventral  Rotator cuff (capsule) sprain  HLD (hyperlipidemia)  HTN (hypertension)  CAD (coronary artery disease)  Arthritis  S/P discectomy: lumbar- 1996  Gastric disorder: gastric hernia repair- 3/16/12  S/P CABG x 3: 8/18/11  S/P arthroscopy of shoulder: right rotator cuff repair- 2006  S/P arthroscopy of shoulder: 2001- left rotator cuff repair  S/P hysterectomy: 1972    MEDICATIONS  (STANDING):  enalapril 2.5 milliGRAM(s) Oral daily  ferrous    sulfate 325 milliGRAM(s) Oral daily  pantoprazole    Tablet 40 milliGRAM(s) Oral two times a day  simvastatin 20 milliGRAM(s) Oral at bedtime    MEDICATIONS  (PRN):  acetaminophen   Tablet .. 650 milliGRAM(s) Oral every 6 hours PRN Temp greater or equal to 38C (100.4F), Mild Pain (1 - 3)    T(C): 36.8 (09-23-19 @ 08:03), Max: 36.8 (09-22-19 @ 16:23)  HR: 64 (09-23-19 @ 08:03) (62 - 79)  BP: 118/75 (09-23-19 @ 08:03) (90/57 - 122/74)  RR: 20 (09-23-19 @ 08:03) (19 - 20)  SpO2: 94% (09-23-19 @ 00:58) (94% - 94%)    I&O's Summary    22 Sep 2019 07:01  -  23 Sep 2019 07:00  --------------------------------------------------------  IN: 240 mL / OUT: 0 mL / NET: 240 mL                            9.8    5.40  )-----------( 264      ( 23 Sep 2019 06:02 )             29.5

## 2019-09-23 NOTE — DISCHARGE NOTE PROVIDER - CARE PROVIDER_API CALL
primary care,   [facility]  Phone: (   )    -  Fax: (   )    -  Follow Up Time:     Ene Ritchie (DO)  Gastroenterology  [address] [facility] [facility], [facility] [address]  [place] [address]  Phone: [phone]  Fax: [phone]  Follow Up Time:

## 2019-09-23 NOTE — PHYSICAL THERAPY INITIAL EVALUATION ADULT - ADDITIONAL COMMENTS
per patient at baseline the has a left LE foot drop from previous back injuries. She lives alone, 1 step to enter where she hold onto the door way. She has 6+6 steps with a handrail inside her home. She says she did this purposefully to stay strong. Pt does not use an AFO for her foot drop but has one. She owns a RW but ambulates without an AD.

## 2019-09-23 NOTE — DISCHARGE NOTE PROVIDER - NSDCCPCAREPLAN_GEN_ALL_CORE_FT
PRINCIPAL DISCHARGE DIAGNOSIS  Diagnosis: GI bleed requiring more than 4 units of blood in 24 hours, ICU, or surgery  Assessment and Plan of Treatment:       SECONDARY DISCHARGE DIAGNOSES  Diagnosis: Iron deficiency anemia due to chronic blood loss  Assessment and Plan of Treatment:

## 2019-09-23 NOTE — PHYSICAL THERAPY INITIAL EVALUATION ADULT - PERTINENT HX OF CURRENT PROBLEM, REHAB EVAL
Pt presents to [facility] with reports of melanotic stool. Pt found to have a GI bleed.

## 2019-09-23 NOTE — DISCHARGE NOTE PROVIDER - HOSPITAL COURSE
88 y/o female with PMH of CAD s/p CABG, arthritis, hiatal hernia presents to [facility] for 2 days history of black stools and dark vomitus. Per patient and daughter at bedside she has not been feeling well for about 3 weeks, has been losing weight and decreased PO intake. Takes aspirin 325mg daily.         On arrival to the ER found to be hypotensive to 80s, tachy to 110. Hgb found to have hemoglobin of 4. CTA abdomen/pelvis without etiology of bleeding. Patient brought to [facility] and transfused prbcs and had an egd on 9/20"        · Operative Findings    Esophagus - there is a 6 cm hiatal hernia. There was a schazki's ring which was wide open. After retroflexion , there was a small mucosal tear at the Ge junction. Mild oozing. The oozing stopped with 2 cc epinephrine. The stomach was other wise normal. D1 normal. D2, at the duodenal sweep, there was a 1 cm white based ulcer. Small central stain. No vessel or active bleeding. D2 otherwise bleeding. Random biopsies taken of antrum and body  to r/o HP        Patient downgraded to medicine and received a total of 4 units prbcs. Patient is now stable for dc home with close follow up with GI        time spent on dc 32 minutes

## 2019-09-23 NOTE — DISCHARGE NOTE NURSING/CASE MANAGEMENT/SOCIAL WORK - PATIENT PORTAL LINK FT
You can access the FollowMyHealth Patient Portal offered by [facility] by registering at the following website: [email or web address]. By joining [employer]’s FollowMyHealth portal, you will also be able to view your health information using other applications (apps) compatible with our system.

## 2019-09-23 NOTE — PROGRESS NOTE ADULT - PROBLEM SELECTOR PLAN 1
DU, hiatal hernia, schatzki's ring  NO active bleeding from DU  - protonix bid  - advnace diet to solid.   given advanced and and cardiac hx, would transfuse one additional unit PRBC today . Anticipate D/C home in am. Restart ASA on 9/26
Patient DU- non bleeding at time of EGD. SChatzki's ring ( wide open_  PPI BID  may advance diet to solids in am
Status post EGD.  Ulcers clean based.  Continue pantoprazole 40 mg BID x 2 then daily thereafter.  Tolerating diet, hemoglobin stable.  No barriers to discharge from GI perspective.

## 2019-09-23 NOTE — PROGRESS NOTE ADULT - PROBLEM SELECTOR PROBLEM 4
Coronary artery disease, angina presence unspecified, unspecified vessel or lesion type, unspecified whether native or transplanted heart
Statement Selected

## 2019-09-23 NOTE — DISCHARGE NOTE PROVIDER - PROVIDER TOKENS
FREE:[LAST:[primary care],PHONE:[(   )    -],FAX:[(   )    -],ADDRESS:[[facility]]],PROVIDER:[TOKEN:[[id number]]]

## 2019-09-24 LAB — SURGICAL PATHOLOGY STUDY: SIGNIFICANT CHANGE UP

## 2019-09-27 ENCOUNTER — APPOINTMENT (OUTPATIENT)
Dept: GASTROENTEROLOGY | Facility: CLINIC | Age: 84
End: 2019-09-27
Payer: MEDICARE

## 2019-09-27 VITALS — OXYGEN SATURATION: 95 % | WEIGHT: 122 LBS | RESPIRATION RATE: 15 BRPM | BODY MASS INDEX: 23.95 KG/M2 | HEIGHT: 60 IN

## 2019-09-27 VITALS — SYSTOLIC BLOOD PRESSURE: 107 MMHG | DIASTOLIC BLOOD PRESSURE: 67 MMHG | HEART RATE: 68 BPM

## 2019-09-27 DIAGNOSIS — E78.00 PURE HYPERCHOLESTEROLEMIA, UNSPECIFIED: ICD-10-CM

## 2019-09-27 PROCEDURE — 99495 TRANSJ CARE MGMT MOD F2F 14D: CPT

## 2019-09-27 NOTE — HISTORY OF PRESENT ILLNESS
[de-identified] : On September 19 of this year she was hospitalized at [facility] with a complaint of dark or black loose stools which were quite voluminous and began 3 days prior to admission with some mild lower abdominal cramping. On arrival to the emergency room hemoglobin was 4.1 and she was hypotensive. He responded to intravenous resuscitation. She also has some transient chest pain. He had a prior history of a known moderately large hiatal hernia and had been experiencing some decrease in her appetite as well as occasional heartburn. She was placed on a proton pump inhibitor one week prior to her admission. A CT today did not reveal any active bleeding but there was high-grade stenosis of the celiac and SMA arteries. She underwent an upper endoscopy that revealed a 6 cm hiatal hernia and oozing Kadi-Arzola tear at the gastroesophageal junction which stopped bleeding after the injection of epinephrine. There was also a nonobstructing Schatzki ring. At the duodenal sweep there was a 1 cm white based ulcer with a small central stain but no active bleeding. Biopsies were negative for H. pylori. Since discharge 4 days ago she has been taking pantoprazole 40 mg p.o. b.i.d. and ferrous sulfate one tablet daily. She is feeling well for normal brown daily bowel movement and no abdominal pain, nausea or vomiting. She is eating much better with an increase in her appetite and less early satiety.

## 2019-09-27 NOTE — ASSESSMENT
[FreeTextEntry1] : At this time I have recommended that she continue to take the pantoprazole 40 mg p.o. b.i.d. for 4 weeks and then decrease to 40 mg p.o. q.a.m. which she should continue indefinitely as she takes aspirin on a daily basis for underlying coronary artery disease. She should also increase the iron dose to twice daily and take stool softeners as needed if she develops any constipation. In one week to undergo her in CBC as well as basic metabolic profile in 4 weeks later she will undergo a repeat CBC with iron studies. She will be seen again in 2 months for further followup.

## 2019-09-27 NOTE — PHYSICAL EXAM
[General Appearance - Alert] : alert [General Appearance - Well Nourished] : well nourished [General Appearance - In No Acute Distress] : in no acute distress [General Appearance - Well Developed] : well developed [General Appearance - Well-Appearing] : healthy appearing [Sclera] : the sclera and conjunctiva were normal [PERRL With Normal Accommodation] : pupils were equal in size, round, and reactive to light [Extraocular Movements] : extraocular movements were intact [Outer Ear] : the ears and nose were normal in appearance [Hearing Threshold Finger Rub Not [place]] : hearing was normal [Oropharynx] : the oropharynx was normal [Examination Of The Oral Cavity] : the lips and gums were normal [Neck Appearance] : the appearance of the neck was normal [Auscultation Breath Sounds / Voice Sounds] : lungs were clear to auscultation bilaterally [Heart Rate And Rhythm] : heart rate was normal and rhythm regular [Heart Sounds Gallop] : no gallops [Murmurs] : no murmurs [Heart Sounds] : normal S1 and S2 [Heart Sounds Pericardial Friction Rub] : no pericardial rub [Bowel Sounds] : normal bowel sounds [Abdomen Soft] : soft [Abdomen Tenderness] : non-tender [Abdomen Mass (___ Cm)] : no abdominal mass palpated [Abnormal Walk] : normal gait [Nail Clubbing] : no clubbing  or cyanosis of the fingernails [Motor Tone] : muscle strength and tone were normal [Musculoskeletal - Swelling] : no joint swelling seen [Skin Color & Pigmentation] : normal skin color and pigmentation [Skin Turgor] : normal skin turgor [Motor Exam] : the motor exam was normal [] : no rash [No Focal Deficits] : no focal deficits [Oriented To Time, Place, And Person] : oriented to person, place, and time [Impaired Insight] : insight and judgment were intact [Affect] : the affect was normal

## 2019-10-02 PROCEDURE — 86902 BLOOD TYPE ANTIGEN DONOR EA: CPT

## 2019-10-02 PROCEDURE — 80048 BASIC METABOLIC PNL TOTAL CA: CPT

## 2019-10-02 PROCEDURE — 85018 HEMOGLOBIN: CPT

## 2019-10-02 PROCEDURE — 88342 IMHCHEM/IMCYTCHM 1ST ANTB: CPT

## 2019-10-02 PROCEDURE — 86850 RBC ANTIBODY SCREEN: CPT

## 2019-10-02 PROCEDURE — 93005 ELECTROCARDIOGRAM TRACING: CPT

## 2019-10-02 PROCEDURE — 85027 COMPLETE CBC AUTOMATED: CPT

## 2019-10-02 PROCEDURE — 73502 X-RAY EXAM HIP UNI 2-3 VIEWS: CPT

## 2019-10-02 PROCEDURE — P9016: CPT

## 2019-10-02 PROCEDURE — 36430 TRANSFUSION BLD/BLD COMPNT: CPT

## 2019-10-02 PROCEDURE — 36415 COLL VENOUS BLD VENIPUNCTURE: CPT

## 2019-10-02 PROCEDURE — 84100 ASSAY OF PHOSPHORUS: CPT

## 2019-10-02 PROCEDURE — 85014 HEMATOCRIT: CPT

## 2019-10-02 PROCEDURE — 70450 CT HEAD/BRAIN W/O DYE: CPT

## 2019-10-02 PROCEDURE — 80053 COMPREHEN METABOLIC PANEL: CPT

## 2019-10-02 PROCEDURE — 96375 TX/PRO/DX INJ NEW DRUG ADDON: CPT | Mod: XU

## 2019-10-02 PROCEDURE — 85730 THROMBOPLASTIN TIME PARTIAL: CPT

## 2019-10-02 PROCEDURE — 84484 ASSAY OF TROPONIN QUANT: CPT

## 2019-10-02 PROCEDURE — 96361 HYDRATE IV INFUSION ADD-ON: CPT

## 2019-10-02 PROCEDURE — C1751: CPT

## 2019-10-02 PROCEDURE — 88305 TISSUE EXAM BY PATHOLOGIST: CPT

## 2019-10-02 PROCEDURE — 86922 COMPATIBILITY TEST ANTIGLOB: CPT

## 2019-10-02 PROCEDURE — 83735 ASSAY OF MAGNESIUM: CPT

## 2019-10-02 PROCEDURE — 81001 URINALYSIS AUTO W/SCOPE: CPT

## 2019-10-02 PROCEDURE — 74174 CTA ABD&PLVS W/CONTRAST: CPT

## 2019-10-02 PROCEDURE — 36555 INSERT NON-TUNNEL CV CATH: CPT

## 2019-10-02 PROCEDURE — 86901 BLOOD TYPING SEROLOGIC RH(D): CPT

## 2019-10-02 PROCEDURE — 71045 X-RAY EXAM CHEST 1 VIEW: CPT

## 2019-10-02 PROCEDURE — 85610 PROTHROMBIN TIME: CPT

## 2019-10-02 PROCEDURE — 99291 CRITICAL CARE FIRST HOUR: CPT | Mod: 25

## 2019-10-02 PROCEDURE — 96374 THER/PROPH/DIAG INJ IV PUSH: CPT | Mod: XU

## 2019-10-02 PROCEDURE — 86900 BLOOD TYPING SEROLOGIC ABO: CPT

## 2019-10-07 LAB
ANION GAP SERPL CALC-SCNC: 12 MMOL/L
BASOPHILS # BLD AUTO: 0.05 K/UL
BASOPHILS NFR BLD AUTO: 0.7 %
BUN SERPL-MCNC: 41 MG/DL
CALCIUM SERPL-MCNC: 9.4 MG/DL
CHLORIDE SERPL-SCNC: 103 MMOL/L
CO2 SERPL-SCNC: 28 MMOL/L
CREAT SERPL-MCNC: 1.06 MG/DL
EOSINOPHIL # BLD AUTO: 0.16 K/UL
EOSINOPHIL NFR BLD AUTO: 2.3 %
GLUCOSE SERPL-MCNC: 105 MG/DL
HCT VFR BLD CALC: 36.3 %
HGB BLD-MCNC: 10.8 G/DL
IMM GRANULOCYTES NFR BLD AUTO: 0.1 %
INR PPP: 0.97 RATIO
LYMPHOCYTES # BLD AUTO: 1.47 K/UL
LYMPHOCYTES NFR BLD AUTO: 21.5 %
MAN DIFF?: NORMAL
MCHC RBC-ENTMCNC: 29.8 GM/DL
MCHC RBC-ENTMCNC: 30.3 PG
MCV RBC AUTO: 102 FL
MONOCYTES # BLD AUTO: 0.51 K/UL
MONOCYTES NFR BLD AUTO: 7.5 %
NEUTROPHILS # BLD AUTO: 4.64 K/UL
NEUTROPHILS NFR BLD AUTO: 67.9 %
PLATELET # BLD AUTO: 439 K/UL
POTASSIUM SERPL-SCNC: 5.5 MMOL/L
PT BLD: 10.9 SEC
RBC # BLD: 3.56 M/UL
RBC # FLD: 15.1 %
SODIUM SERPL-SCNC: 143 MMOL/L
WBC # FLD AUTO: 6.84 K/UL

## 2019-10-14 ENCOUNTER — TRANSCRIPTION ENCOUNTER (OUTPATIENT)
Age: 84
End: 2019-10-14

## 2019-10-29 LAB
BASOPHILS # BLD AUTO: 0.05 K/UL
BASOPHILS NFR BLD AUTO: 1 %
EOSINOPHIL # BLD AUTO: 0.17 K/UL
EOSINOPHIL NFR BLD AUTO: 3.3 %
FERRITIN SERPL-MCNC: 37 NG/ML
HCT VFR BLD CALC: 40.6 %
HGB BLD-MCNC: 11.8 G/DL
IMM GRANULOCYTES NFR BLD AUTO: 0.2 %
IRON SATN MFR SERPL: 20 %
IRON SERPL-MCNC: 80 UG/DL
LYMPHOCYTES # BLD AUTO: 1.49 K/UL
LYMPHOCYTES NFR BLD AUTO: 29.3 %
MAN DIFF?: NORMAL
MCHC RBC-ENTMCNC: 28.2 PG
MCHC RBC-ENTMCNC: 29.1 GM/DL
MCV RBC AUTO: 96.9 FL
MONOCYTES # BLD AUTO: 0.38 K/UL
MONOCYTES NFR BLD AUTO: 7.5 %
NEUTROPHILS # BLD AUTO: 2.99 K/UL
NEUTROPHILS NFR BLD AUTO: 58.7 %
PLATELET # BLD AUTO: 346 K/UL
RBC # BLD: 4.19 M/UL
RBC # FLD: 14.6 %
TIBC SERPL-MCNC: 405 UG/DL
UIBC SERPL-MCNC: 325 UG/DL
WBC # FLD AUTO: 5.09 K/UL

## 2019-11-20 ENCOUNTER — APPOINTMENT (OUTPATIENT)
Dept: GASTROENTEROLOGY | Facility: CLINIC | Age: 84
End: 2019-11-20
Payer: MEDICARE

## 2019-11-20 VITALS
DIASTOLIC BLOOD PRESSURE: 71 MMHG | SYSTOLIC BLOOD PRESSURE: 134 MMHG | WEIGHT: 130 LBS | BODY MASS INDEX: 25.52 KG/M2 | RESPIRATION RATE: 16 BRPM | OXYGEN SATURATION: 99 % | HEART RATE: 64 BPM | HEIGHT: 60 IN

## 2019-11-20 PROCEDURE — 99214 OFFICE O/P EST MOD 30 MIN: CPT

## 2019-11-20 NOTE — HISTORY OF PRESENT ILLNESS
[de-identified] : Since her last visit she continues to take pantoprazole 40 mg p.o. b.i.d. but has discontinued the iron supplements. On October 25 of this year a CBC and iron studies were normal. She is feeling well without any abdominal pain, nausea, vomiting, diarrhea or constipation. There is no rectal bleeding or melena. Her appetite and weight are stable.

## 2019-11-20 NOTE — ASSESSMENT
[FreeTextEntry1] : At this time I recommended that she decrease the pantoprazole to 40 mg p.o. q.a.m. on which she should continue indefinitely in her for prior upper GI bleed and a lack of any H. pylori infection. I would recommend that she obtain a serum magnesium and B12 level every 6 months. As long as she is doing well I do not think any regular followup by myself as necessary. She will be seen again as needed.

## 2019-11-20 NOTE — PHYSICAL EXAM
[General Appearance - Alert] : alert [General Appearance - In No Acute Distress] : in no acute distress [General Appearance - Well Nourished] : well nourished [General Appearance - Well Developed] : well developed [General Appearance - Well-Appearing] : healthy appearing [Sclera] : the sclera and conjunctiva were normal [PERRL With Normal Accommodation] : pupils were equal in size, round, and reactive to light [Outer Ear] : the ears and nose were normal in appearance [Extraocular Movements] : extraocular movements were intact [Hearing Threshold Finger Rub Not [place]] : hearing was normal [Examination Of The Oral Cavity] : the lips and gums were normal [Neck Appearance] : the appearance of the neck was normal [Oropharynx] : the oropharynx was normal [Auscultation Breath Sounds / Voice Sounds] : lungs were clear to auscultation bilaterally [Heart Sounds Gallop] : no gallops [Heart Sounds] : normal S1 and S2 [Heart Rate And Rhythm] : heart rate was normal and rhythm regular [Heart Sounds Pericardial Friction Rub] : no pericardial rub [Bowel Sounds] : normal bowel sounds [Murmurs] : no murmurs [Abdomen Soft] : soft [Abdomen Tenderness] : non-tender [Abnormal Walk] : normal gait [Nail Clubbing] : no clubbing  or cyanosis of the fingernails [Abdomen Mass (___ Cm)] : no abdominal mass palpated [Skin Color & Pigmentation] : normal skin color and pigmentation [Musculoskeletal - Swelling] : no joint swelling seen [Motor Tone] : muscle strength and tone were normal [] : no rash [No Focal Deficits] : no focal deficits [Motor Exam] : the motor exam was normal [Skin Turgor] : normal skin turgor [Affect] : the affect was normal [Impaired Insight] : insight and judgment were intact [Oriented To Time, Place, And Person] : oriented to person, place, and time

## 2020-03-03 ENCOUNTER — APPOINTMENT (OUTPATIENT)
Dept: OBGYN | Facility: CLINIC | Age: 85
End: 2020-03-03
Payer: MEDICARE

## 2020-03-03 VITALS
HEIGHT: 58 IN | DIASTOLIC BLOOD PRESSURE: 64 MMHG | SYSTOLIC BLOOD PRESSURE: 112 MMHG | WEIGHT: 130 LBS | BODY MASS INDEX: 27.29 KG/M2

## 2020-03-03 DIAGNOSIS — Z86.39 PERSONAL HISTORY OF OTHER ENDOCRINE, NUTRITIONAL AND METABOLIC DISEASE: ICD-10-CM

## 2020-03-03 DIAGNOSIS — K22.6 GASTRO-ESOPHAGEAL LACERATION-HEMORRHAGE SYNDROME: ICD-10-CM

## 2020-03-03 DIAGNOSIS — N60.19 DIFFUSE CYSTIC MASTOPATHY OF UNSPECIFIED BREAST: ICD-10-CM

## 2020-03-03 DIAGNOSIS — Z80.7 FAMILY HISTORY OF OTHER MALIGNANT NEOPLASMS OF LYMPHOID, HEMATOPOIETIC AND RELATED TISSUES: ICD-10-CM

## 2020-03-03 DIAGNOSIS — I25.10 ATHEROSCLEROTIC HEART DISEASE OF NATIVE CORONARY ARTERY W/OUT ANGINA PECTORIS: ICD-10-CM

## 2020-03-03 DIAGNOSIS — N95.2 POSTMENOPAUSAL ATROPHIC VAGINITIS: ICD-10-CM

## 2020-03-03 DIAGNOSIS — Z82.62 FAMILY HISTORY OF OSTEOPOROSIS: ICD-10-CM

## 2020-03-03 PROCEDURE — 82270 OCCULT BLOOD FECES: CPT

## 2020-03-03 PROCEDURE — G0101: CPT

## 2020-03-03 RX ORDER — ESTRADIOL 2 MG/1
2 RING VAGINAL
Refills: 0 | Status: DISCONTINUED | COMMUNITY
End: 2020-03-03

## 2020-03-03 RX ORDER — DOCUSATE SODIUM 100 MG/1
100 CAPSULE ORAL
Refills: 0 | Status: DISCONTINUED | COMMUNITY
End: 2020-03-03

## 2020-03-03 RX ORDER — ASPIRIN 325 MG/1
325 TABLET, FILM COATED ORAL
Refills: 0 | Status: DISCONTINUED | COMMUNITY
End: 2020-03-03

## 2020-03-03 RX ORDER — PANTOPRAZOLE 40 MG/1
40 TABLET, DELAYED RELEASE ORAL
Qty: 60 | Refills: 5 | Status: DISCONTINUED | COMMUNITY
Start: 2019-09-27 | End: 2020-03-03

## 2020-03-05 PROBLEM — N95.2 POSTMENOPAUSAL ATROPHIC VAGINITIS: Status: ACTIVE | Noted: 2019-01-04

## 2020-03-05 PROBLEM — Z80.7 FAMILY HISTORY OF HODGKIN'S LYMPHOMA: Status: ACTIVE | Noted: 2019-01-04

## 2020-03-05 PROBLEM — I25.10 CORONARY ARTERY DISEASE, ANGINA PRESENCE UNSPECIFIED, UNSPECIFIED VESSEL OR LESION TYPE, UNSPECIFIED WHETHER NATIVE OR TRANSPLANTED HEART: Status: ACTIVE | Noted: 2019-01-04

## 2020-03-05 PROBLEM — Z82.62 FAMILY HISTORY OF OSTEOPOROSIS: Status: ACTIVE | Noted: 2019-01-04

## 2020-03-05 PROBLEM — Z86.39 HISTORY OF HYPERLIPIDEMIA: Status: RESOLVED | Noted: 2019-09-27 | Resolved: 2020-03-05

## 2020-03-05 PROBLEM — K22.6 MALLORY-WEISS TEAR: Status: RESOLVED | Noted: 2019-09-27 | Resolved: 2020-03-05

## 2020-03-05 NOTE — PHYSICAL EXAM
[Awake] : awake [Alert] : alert [Acute Distress] : no acute distress [Mass] : no breast mass [Nipple Discharge] : no nipple discharge [Axillary LAD] : no axillary lymphadenopathy [Soft] : soft [Tender] : non tender [Oriented x3] : oriented to person, place, and time [Depressed Mood] : not depressed [Labia Majora] : labia major [Labia Minora] : labia minora [Normal] : clitoris [Atrophy] : atrophy [Dry Mucosa] : dry mucosa [No Bleeding] : there was no active vaginal bleeding [Absent] : absent [Uterine Adnexae] : were not tender and not enlarged [No Tenderness] : no rectal tenderness [Occult Blood] : occult blood test from digital rectal exam was negative [RRR, No Murmurs] : RRR, no murmurs [CTAB] : CTAB

## 2020-03-05 NOTE — HISTORY OF PRESENT ILLNESS
[Good] : being in good health [Regular Exercise] : regular exercise [Healthy Diet] : a healthy diet [Total Preg ___] : [unfilled] [Full Term ___] : [unfilled] [Pregnancy History] : pregnancy history: [Living ___] : [unfilled] [AB Spont ___] : miscarriages: [unfilled] [Definite:  ___ (Date)] : the last menstrual period was [unfilled] [Menarche Age: ____] : age at menarche was [unfilled] [Last Mammogram ___] : Last Mammogram was [unfilled] [Last Bone Density ___] : Last bone density studies [unfilled] [Last Pap ___] : Last cervical pap smear was [unfilled] [Postmenopausal] : is postmenopausal [Menstrual Problems] : reports normal menses [de-identified] : BREAST U/S 6/12/2015 BR1 [Sexually Active] : is not sexually active [Contraception] : does not use contraception

## 2020-03-14 LAB
DATE COLLECTED: NORMAL
HEMOCCULT SP1 STL QL: NEGATIVE
QUALITY CONTROL: YES

## 2020-05-26 NOTE — PATIENT PROFILE ADULT - NSASFALLNEEDSASSISTWITH_GEN_A_NUR
Telephone visit with patient about a month ago regarding blood pressure, can you please have her schedule her physical with me, perhaps she can even come in tomorrow, depending on time, would recommend fasting labs at that appointment.     toileting/walking/standing

## 2020-06-29 ENCOUNTER — APPOINTMENT (OUTPATIENT)
Dept: MAMMOGRAPHY | Facility: CLINIC | Age: 85
End: 2020-06-29
Payer: MEDICARE

## 2020-06-29 ENCOUNTER — RESULT REVIEW (OUTPATIENT)
Age: 85
End: 2020-06-29

## 2020-06-29 ENCOUNTER — OUTPATIENT (OUTPATIENT)
Dept: OUTPATIENT SERVICES | Facility: HOSPITAL | Age: 85
LOS: 1 days | End: 2020-06-29
Payer: MEDICARE

## 2020-06-29 DIAGNOSIS — Z98.89 OTHER SPECIFIED POSTPROCEDURAL STATES: Chronic | ICD-10-CM

## 2020-06-29 DIAGNOSIS — Z90.710 ACQUIRED ABSENCE OF BOTH CERVIX AND UTERUS: Chronic | ICD-10-CM

## 2020-06-29 DIAGNOSIS — K31.9 DISEASE OF STOMACH AND DUODENUM, UNSPECIFIED: Chronic | ICD-10-CM

## 2020-06-29 DIAGNOSIS — N60.19 DIFFUSE CYSTIC MASTOPATHY OF UNSPECIFIED BREAST: ICD-10-CM

## 2020-06-29 DIAGNOSIS — Z95.1 PRESENCE OF AORTOCORONARY BYPASS GRAFT: Chronic | ICD-10-CM

## 2020-06-29 DIAGNOSIS — Z00.8 ENCOUNTER FOR OTHER GENERAL EXAMINATION: ICD-10-CM

## 2020-06-29 PROCEDURE — 77063 BREAST TOMOSYNTHESIS BI: CPT | Mod: 26

## 2020-06-29 PROCEDURE — 77063 BREAST TOMOSYNTHESIS BI: CPT

## 2020-06-29 PROCEDURE — 77067 SCR MAMMO BI INCL CAD: CPT | Mod: 26

## 2020-06-29 PROCEDURE — 77067 SCR MAMMO BI INCL CAD: CPT

## 2020-09-14 ENCOUNTER — EMERGENCY (EMERGENCY)
Facility: HOSPITAL | Age: 85
LOS: 1 days | Discharge: HOME | End: 2020-09-14
Attending: EMERGENCY MEDICINE
Payer: MEDICARE

## 2020-09-14 VITALS
OXYGEN SATURATION: 99 % | RESPIRATION RATE: 16 BRPM | HEART RATE: 82 BPM | HEIGHT: 57 IN | SYSTOLIC BLOOD PRESSURE: 142 MMHG | TEMPERATURE: 98 F | WEIGHT: 126.99 LBS | DIASTOLIC BLOOD PRESSURE: 83 MMHG

## 2020-09-14 VITALS
HEART RATE: 79 BPM | RESPIRATION RATE: 20 BRPM | TEMPERATURE: 98 F | OXYGEN SATURATION: 99 % | SYSTOLIC BLOOD PRESSURE: 141 MMHG | DIASTOLIC BLOOD PRESSURE: 70 MMHG

## 2020-09-14 DIAGNOSIS — K31.9 DISEASE OF STOMACH AND DUODENUM, UNSPECIFIED: Chronic | ICD-10-CM

## 2020-09-14 DIAGNOSIS — Z98.89 OTHER SPECIFIED POSTPROCEDURAL STATES: Chronic | ICD-10-CM

## 2020-09-14 DIAGNOSIS — Z90.710 ACQUIRED ABSENCE OF BOTH CERVIX AND UTERUS: Chronic | ICD-10-CM

## 2020-09-14 DIAGNOSIS — Z95.1 PRESENCE OF AORTOCORONARY BYPASS GRAFT: Chronic | ICD-10-CM

## 2020-09-14 LAB
ALBUMIN SERPL ELPH-MCNC: 3.7 G/DL — SIGNIFICANT CHANGE UP (ref 3.3–5.2)
ALP SERPL-CCNC: 76 U/L — SIGNIFICANT CHANGE UP (ref 40–120)
ALT FLD-CCNC: 32 U/L — SIGNIFICANT CHANGE UP
ANION GAP SERPL CALC-SCNC: 11 MMOL/L — SIGNIFICANT CHANGE UP (ref 5–17)
APPEARANCE UR: ABNORMAL
APTT BLD: 29.3 SEC — SIGNIFICANT CHANGE UP (ref 27.5–35.5)
AST SERPL-CCNC: 27 U/L — SIGNIFICANT CHANGE UP
BACTERIA # UR AUTO: ABNORMAL
BASOPHILS # BLD AUTO: 0 K/UL — SIGNIFICANT CHANGE UP (ref 0–0.2)
BASOPHILS NFR BLD AUTO: 0 % — SIGNIFICANT CHANGE UP (ref 0–2)
BILIRUB SERPL-MCNC: 0.3 MG/DL — LOW (ref 0.4–2)
BILIRUB UR-MCNC: NEGATIVE — SIGNIFICANT CHANGE UP
BUN SERPL-MCNC: 30 MG/DL — HIGH (ref 8–20)
CALCIUM SERPL-MCNC: 9.5 MG/DL — SIGNIFICANT CHANGE UP (ref 8.6–10.2)
CHLORIDE SERPL-SCNC: 101 MMOL/L — SIGNIFICANT CHANGE UP (ref 98–107)
CO2 SERPL-SCNC: 25 MMOL/L — SIGNIFICANT CHANGE UP (ref 22–29)
COLOR SPEC: YELLOW — SIGNIFICANT CHANGE UP
COMMENT - URINE: SIGNIFICANT CHANGE UP
CREAT SERPL-MCNC: 0.75 MG/DL — SIGNIFICANT CHANGE UP (ref 0.5–1.3)
DIFF PNL FLD: ABNORMAL
EOSINOPHIL # BLD AUTO: 0 K/UL — SIGNIFICANT CHANGE UP (ref 0–0.5)
EOSINOPHIL NFR BLD AUTO: 0 % — SIGNIFICANT CHANGE UP (ref 0–6)
EPI CELLS # UR: SIGNIFICANT CHANGE UP
GLUCOSE SERPL-MCNC: 148 MG/DL — HIGH (ref 70–99)
GLUCOSE UR QL: NEGATIVE — SIGNIFICANT CHANGE UP
HCT VFR BLD CALC: 37.1 % — SIGNIFICANT CHANGE UP (ref 34.5–45)
HGB BLD-MCNC: 12.5 G/DL — SIGNIFICANT CHANGE UP (ref 11.5–15.5)
INR BLD: 1.03 RATIO — SIGNIFICANT CHANGE UP (ref 0.88–1.16)
KETONES UR-MCNC: NEGATIVE — SIGNIFICANT CHANGE UP
LACTATE BLDV-MCNC: 0.9 MMOL/L — SIGNIFICANT CHANGE UP (ref 0.5–2)
LEUKOCYTE ESTERASE UR-ACNC: ABNORMAL
LIDOCAIN IGE QN: 18 U/L — LOW (ref 22–51)
LYMPHOCYTES # BLD AUTO: 0.37 K/UL — LOW (ref 1–3.3)
LYMPHOCYTES # BLD AUTO: 3.5 % — LOW (ref 13–44)
MCHC RBC-ENTMCNC: 30.2 PG — SIGNIFICANT CHANGE UP (ref 27–34)
MCHC RBC-ENTMCNC: 33.7 GM/DL — SIGNIFICANT CHANGE UP (ref 32–36)
MCV RBC AUTO: 89.6 FL — SIGNIFICANT CHANGE UP (ref 80–100)
MONOCYTES # BLD AUTO: 0.37 K/UL — SIGNIFICANT CHANGE UP (ref 0–0.9)
MONOCYTES NFR BLD AUTO: 3.5 % — SIGNIFICANT CHANGE UP (ref 2–14)
NEUTROPHILS # BLD AUTO: 9.71 K/UL — HIGH (ref 1.8–7.4)
NEUTROPHILS NFR BLD AUTO: 89.6 % — HIGH (ref 43–77)
NITRITE UR-MCNC: POSITIVE
OB PNL STL: NEGATIVE — SIGNIFICANT CHANGE UP
PH UR: 6 — SIGNIFICANT CHANGE UP (ref 5–8)
PLATELET # BLD AUTO: 227 K/UL — SIGNIFICANT CHANGE UP (ref 150–400)
POTASSIUM SERPL-MCNC: 4.3 MMOL/L — SIGNIFICANT CHANGE UP (ref 3.5–5.3)
POTASSIUM SERPL-SCNC: 4.3 MMOL/L — SIGNIFICANT CHANGE UP (ref 3.5–5.3)
PROT SERPL-MCNC: 6.8 G/DL — SIGNIFICANT CHANGE UP (ref 6.6–8.7)
PROT UR-MCNC: 30 MG/DL
PROTHROM AB SERPL-ACNC: 11.9 SEC — SIGNIFICANT CHANGE UP (ref 10.6–13.6)
RBC # BLD: 4.14 M/UL — SIGNIFICANT CHANGE UP (ref 3.8–5.2)
RBC # FLD: 13.6 % — SIGNIFICANT CHANGE UP (ref 10.3–14.5)
RBC CASTS # UR COMP ASSIST: ABNORMAL /HPF (ref 0–4)
SODIUM SERPL-SCNC: 137 MMOL/L — SIGNIFICANT CHANGE UP (ref 135–145)
SP GR SPEC: 1.01 — SIGNIFICANT CHANGE UP (ref 1.01–1.02)
TROPONIN T SERPL-MCNC: <0.01 NG/ML — SIGNIFICANT CHANGE UP (ref 0–0.06)
UROBILINOGEN FLD QL: NEGATIVE — SIGNIFICANT CHANGE UP
WBC # BLD: 10.63 K/UL — HIGH (ref 3.8–10.5)
WBC # FLD AUTO: 10.63 K/UL — HIGH (ref 3.8–10.5)
WBC UR QL: SIGNIFICANT CHANGE UP

## 2020-09-14 PROCEDURE — 85730 THROMBOPLASTIN TIME PARTIAL: CPT

## 2020-09-14 PROCEDURE — 87186 SC STD MICRODIL/AGAR DIL: CPT

## 2020-09-14 PROCEDURE — 96375 TX/PRO/DX INJ NEW DRUG ADDON: CPT

## 2020-09-14 PROCEDURE — 84484 ASSAY OF TROPONIN QUANT: CPT

## 2020-09-14 PROCEDURE — 85610 PROTHROMBIN TIME: CPT

## 2020-09-14 PROCEDURE — 80053 COMPREHEN METABOLIC PANEL: CPT

## 2020-09-14 PROCEDURE — 86900 BLOOD TYPING SEROLOGIC ABO: CPT

## 2020-09-14 PROCEDURE — 81001 URINALYSIS AUTO W/SCOPE: CPT

## 2020-09-14 PROCEDURE — 36415 COLL VENOUS BLD VENIPUNCTURE: CPT

## 2020-09-14 PROCEDURE — 93005 ELECTROCARDIOGRAM TRACING: CPT

## 2020-09-14 PROCEDURE — 86850 RBC ANTIBODY SCREEN: CPT

## 2020-09-14 PROCEDURE — 82272 OCCULT BLD FECES 1-3 TESTS: CPT

## 2020-09-14 PROCEDURE — 96374 THER/PROPH/DIAG INJ IV PUSH: CPT | Mod: XU

## 2020-09-14 PROCEDURE — 74177 CT ABD & PELVIS W/CONTRAST: CPT | Mod: 26

## 2020-09-14 PROCEDURE — 86901 BLOOD TYPING SEROLOGIC RH(D): CPT

## 2020-09-14 PROCEDURE — 83605 ASSAY OF LACTIC ACID: CPT

## 2020-09-14 PROCEDURE — 93010 ELECTROCARDIOGRAM REPORT: CPT

## 2020-09-14 PROCEDURE — 99284 EMERGENCY DEPT VISIT MOD MDM: CPT | Mod: 25

## 2020-09-14 PROCEDURE — 99284 EMERGENCY DEPT VISIT MOD MDM: CPT | Mod: GC

## 2020-09-14 PROCEDURE — 83690 ASSAY OF LIPASE: CPT

## 2020-09-14 PROCEDURE — 74177 CT ABD & PELVIS W/CONTRAST: CPT

## 2020-09-14 PROCEDURE — 87086 URINE CULTURE/COLONY COUNT: CPT

## 2020-09-14 PROCEDURE — 85025 COMPLETE CBC W/AUTO DIFF WBC: CPT

## 2020-09-14 RX ORDER — ONDANSETRON 8 MG/1
4 TABLET, FILM COATED ORAL ONCE
Refills: 0 | Status: COMPLETED | OUTPATIENT
Start: 2020-09-14 | End: 2020-09-14

## 2020-09-14 RX ORDER — CEPHALEXIN 500 MG
1 CAPSULE ORAL
Qty: 28 | Refills: 0
Start: 2020-09-14 | End: 2020-09-27

## 2020-09-14 RX ORDER — SUCRALFATE 1 G
1 TABLET ORAL
Qty: 20 | Refills: 0
Start: 2020-09-14 | End: 2020-09-18

## 2020-09-14 RX ORDER — PANTOPRAZOLE SODIUM 20 MG/1
8 TABLET, DELAYED RELEASE ORAL
Qty: 80 | Refills: 0 | Status: DISCONTINUED | OUTPATIENT
Start: 2020-09-14 | End: 2020-09-14

## 2020-09-14 RX ORDER — ACETAMINOPHEN 500 MG
975 TABLET ORAL ONCE
Refills: 0 | Status: COMPLETED | OUTPATIENT
Start: 2020-09-14 | End: 2020-09-14

## 2020-09-14 RX ORDER — PANTOPRAZOLE SODIUM 20 MG/1
80 TABLET, DELAYED RELEASE ORAL ONCE
Refills: 0 | Status: COMPLETED | OUTPATIENT
Start: 2020-09-14 | End: 2020-09-14

## 2020-09-14 RX ORDER — CEPHALEXIN 500 MG
500 CAPSULE ORAL ONCE
Refills: 0 | Status: DISCONTINUED | OUTPATIENT
Start: 2020-09-14 | End: 2020-09-18

## 2020-09-14 RX ADMIN — PANTOPRAZOLE SODIUM 80 MILLIGRAM(S): 20 TABLET, DELAYED RELEASE ORAL at 09:13

## 2020-09-14 RX ADMIN — Medication 975 MILLIGRAM(S): at 09:13

## 2020-09-14 RX ADMIN — ONDANSETRON 4 MILLIGRAM(S): 8 TABLET, FILM COATED ORAL at 09:13

## 2020-09-14 NOTE — ED PROVIDER NOTE - PATIENT PORTAL LINK FT
You can access the FollowMyHealth Patient Portal offered by [facility] by registering at the following website: [email or web address]. By joining [employer]’s FollowMyHealth portal, you will also be able to view your health information using other applications (apps) compatible with our system.

## 2020-09-14 NOTE — ED ADULT NURSE NOTE - NSIMPLEMENTINTERV_GEN_ALL_ED
Implemented All Fall Risk Interventions:  [place] to call system. Call bell, personal items and telephone within reach. Instruct patient to call for assistance. Room bathroom lighting operational. Non-slip footwear when patient is off stretcher. Physically safe environment: no spills, clutter or unnecessary equipment. Stretcher in lowest position, wheels locked, appropriate side rails in place. Provide visual cue, wrist band, yellow gown, etc. Monitor gait and stability. Monitor for mental status changes and reorient to person, place, and time. Review medications for side effects contributing to fall risk. Reinforce activity limits and safety measures with patient and family.

## 2020-09-14 NOTE — ED PROVIDER NOTE - NSFOLLOWUPINSTRUCTIONS_ED_ALL_ED_FT
No signs of emergency medical condition on today's workup.  Presumptive diagnosis made, but further evaluation may be required by your primary care doctor or specialist for a definitive diagnosis.  Therefore, follow up as directed and if symptoms change/worsen or any emergency conditions, please return to the ER.    - Follow up with your gastroenterologist this week as discussed    - Continue all medications and  your prescriptions for your antibiotic (For UTI treatment) and carafate (for pain relief)    - Avoid use of ibuprofen

## 2020-09-14 NOTE — ED PROVIDER NOTE - PROGRESS NOTE DETAILS
Reggie Modi DO PGY3 - sx improved, tolerating PO. CT findings noted. Will fu with gastroenterologist.

## 2020-09-14 NOTE — ED ADULT NURSE NOTE - OBJECTIVE STATEMENT
pt alert and oriented x4 came in c/o abdominal pain with n/v x1. pt reports this happened last year and she had a GI bleed and a hgb of 4. pt reports it feels the same but not as bad. pt takes asa 81 mg a day. RR even unlabored. pt denies SOB just feeling tired due to not sleeping. pt educated on plan of care, pt able to successfully teach back plan of care to RN, RN will continue to reeducate pt during hospital stay.

## 2020-09-14 NOTE — ED PROVIDER NOTE - OBJECTIVE STATEMENT
89yo F hx CAD s/p CABG on 81mg asa, hiatal hernia, prior UGIB req MICU stay and transfusion p/w 3 days of epigastric pain worse with eating, slight dyspnea, and lightheadedness that feels similar to her prior GIB. Had one episode of diarrhea 3 days ago but no BM since bc of dec PO. Not sure if had a melanotic stool. No fever, chills, cough, cp, back pain, urinary sx.

## 2020-09-14 NOTE — ED PROVIDER NOTE - PHYSICAL EXAMINATION
Gen: Well appearing, NAD  Head: NCAT  HEENT: PERRL, MMM, normal conjunctiva, anicteric, neck supple  Lung: CTAB, no adventitious sounds  CV: RRR, no murmurs  Abd: soft, mildly ttp throughout abd nonfocal, no rebound or guarding, no CVAT  MSK: No edema, no visible deformities  Neuro: Moving all extremity grossly  Skin: Warm and dry, no evidence of rash  KEATON: Gen: Well appearing, NAD  Head: NCAT  HEENT: PERRL, MMM, normal conjunctiva, anicteric, neck supple  Lung: CTAB, no adventitious sounds  CV: RRR, no murmurs  Abd: soft, mildly ttp throughout abd nonfocal, no rebound or guarding, no CVAT  MSK: No edema, no visible deformities  Neuro: Moving all extremity grossly  Skin: Warm and dry, no evidence of rash  KEATON: No gross blood or stool in vault

## 2020-09-14 NOTE — ED ADULT TRIAGE NOTE - BP NONINVASIVE SYSTOLIC (MM HG)
01/27/20 0800   Charting Type   Charting Type Shift assessment   Neurological   Neuro (WDL) X   Level of Consciousness New agitation   Orientation Level Oriented X4   Cognition Follows commands   Extraocular Movements Full   Facial Symmetry Symmetrical   Speech Expressive aphasia   Swallow Able to swallow solids and liquids without difficulty   R Pupil Size (mm) 2   L Pupil Size (mm) 3   RUE Muscle Strength 5- Normal strength   LUE Muscle Strength 5- Normal strength   RLE Muscle Strength 5- Normal strength   LLE Muscle Strength 5- Normal strength   Neuro Symptoms None   Neuro Additional Assessments No   Delirium Assessment- CAM    Acute Onset and Fluctuating Course (1) No   Inattention (2) No   Disorganized Thinking (3) No   Rate Patient's Level of Consciousness (4) Alert (Normal), No   Delirium Present No   Carie Coma Scale   Eye Opening 4   Best Verbal Response 5   Best Motor Response 6   Carie Coma Scale Score 15   HEENT   HEENT (WDL) X   R Eye Mildly impaired vision   L Eye Mildly impaired vision   Vision - Corrective Lenses (sent home) Glasses   R Ear Intact   L Ear Intact   Teeth Missing teeth   Respiratory   Respiratory (WDL) WDL   Respiratory Pattern Normal   Chest Assessment Chest expansion symmetrical   Bilateral Breath Sounds Clear   Respiratory Interventions   Respiratory Interventions Cough and deep breathe;Incentive spirometry   Cough and Deep Breathe   Cough and Deep Breathe Yes   Cardiac   Cardiac (WDL) X   Cardiac Regularity Regular   Heart Sounds No adventitious heart sounds   Jugular Venous Distention (JVD) No   Cardiac Symptoms None   Chest Pain Present No   Pacemaker/ICD No   Pain Assessment   Pain Assessment No/denies pain   Pain Score No Pain   Giron-Baker FACES Pain Rating 0   Peripheral Vascular   Peripheral Vascular (WDL) X   Cyanosis None   Edema Right lower extremity; Left lower extremity   RLE Edema Trace   LLE Edema Trace   PVS Additional Assessments No   RUE Neurovascular Assessment   R Radial Pulse +2   LUE Neurovascular Assessment   L Radial Pulse +2   RLE Neurovascular Assessment   R Pedal Pulse +2   LLE Neurovascular Assessment   L Pedal Pulse +2   Integumentary   Integumentary (WDL) X   Skin Color Appropriate for ethnicity   Skin Condition/Temp Warm;Dry   Skin Integrity Bruising   Skin Location both arms   Skin Turgor Non-tenting   Integumentary Additional Assessments No   Tattoos/Piercings   Does patient have tattoos? No   Piercings Remaining No   Allan Scale   Sensory Perceptions 4   Moisture 3   Activity 3   Mobility 3   Nutrition 3   Friction and Shear 2   Allan Scale Score 18   Musculoskeletal   Musculoskeletal (WDL) X   Level of Assistance Standby assist, set-up cues, supervision of patient - no hands on   Assistive Device Front wheel walker   Musculoskeletal Additional Assessments No   Gastrointestinal   Gastrointestinal (WDL) X   Abdomen Inspection Soft;Obese   Bowel Sounds (All Quadrants) Normoactive   Tenderness Nontender; Soft   GI Symptoms None   Gastrointestinal Additional Assessments No   Stool Assessment   Bowel Incontinence No   Genitourinary   Genitourinary (WDL) WDL   Cough   Cough None 142

## 2020-09-14 NOTE — ED ADULT TRIAGE NOTE - CHIEF COMPLAINT QUOTE
Epigastric pain that began Friday night, had one episode of diarrhea.  Hx of GI bleeding.  Unsure if dark stools.

## 2020-09-14 NOTE — ED PROVIDER NOTE - ATTENDING CONTRIBUTION TO CARE
I, Nabil Sanchez, performed a face to face bedside interview with this patient regarding history of present illness, review of symptoms and relevant past medical, social and family history.  I completed an independent physical examination. I have communicated the patient’s plan of care and disposition with the ACP.  88 year old female with PMH GI bleed presents with abd pain. She reports LUQ and epigastric abd pain that started 3 days ago, worse with eating. No radiation, fevers, chills, nausea, vomiting, diarrhea, dysuria, hematuria, vomiting  Gen: NAD, well appearing  CV: RRR  Pul: CTA b/l  Abd: Soft, non-distended, non-tender  Neuro: no focal deficits  After PPI pt states her Sx completely resolved. No fevers, leukocytosis and no CVA tenderness b/l, no clinical signs of pyelo. Sx likely consistent with PUD. Lactate normal tolerated PO here and feeling well without narcotics and Sx x 3 days, not consuistent with mesenteric ischemia. stable for dc

## 2020-09-14 NOTE — ED PROVIDER NOTE - NS ED ROS FT
CONSTITUTIONAL: No fevers, no chills  EYES: no visual changes, no eye pain  EARS: no ear drainage, no ear pain, no change in hearing  NOSE: no nasal congestion  MOUTH/THROAT: no sore throat  CV: No chest pain, no palpitations  RESP: no cough  GI: No vomiting  : no dysuria, no hematuria, no flank pain  MSK: no back pain, no extremity pain  SKIN: no rashes  NEURO: no headache, no focal weakness, no decreased sensation/parasthesias

## 2020-09-14 NOTE — ED PROVIDER NOTE - CLINICAL SUMMARY MEDICAL DECISION MAKING FREE TEXT BOX
Hx GIB req transfusion, on 81mg asa w/ epigastric pain worse with food and dyspnea/lightheadedness concern for repeat GIB. R/o acs as well ericka w/ cardiac hx. Labs, CT, xr, ekg and likely admit

## 2020-09-20 ENCOUNTER — TRANSCRIPTION ENCOUNTER (OUTPATIENT)
Age: 85
End: 2020-09-20

## 2020-09-20 ENCOUNTER — INPATIENT (INPATIENT)
Facility: HOSPITAL | Age: 85
LOS: 3 days | Discharge: HOME | DRG: 378 | End: 2020-09-24
Attending: HOSPITALIST | Admitting: INTERNAL MEDICINE
Payer: MEDICARE

## 2020-09-20 VITALS
DIASTOLIC BLOOD PRESSURE: 55 MMHG | RESPIRATION RATE: 22 BRPM | HEIGHT: 57 IN | SYSTOLIC BLOOD PRESSURE: 85 MMHG | TEMPERATURE: 98 F | HEART RATE: 102 BPM | WEIGHT: 126.99 LBS

## 2020-09-20 DIAGNOSIS — K92.1 MELENA: ICD-10-CM

## 2020-09-20 DIAGNOSIS — Z95.1 PRESENCE OF AORTOCORONARY BYPASS GRAFT: Chronic | ICD-10-CM

## 2020-09-20 DIAGNOSIS — Z98.89 OTHER SPECIFIED POSTPROCEDURAL STATES: Chronic | ICD-10-CM

## 2020-09-20 DIAGNOSIS — D50.0 IRON DEFICIENCY ANEMIA SECONDARY TO BLOOD LOSS (CHRONIC): ICD-10-CM

## 2020-09-20 DIAGNOSIS — Z90.710 ACQUIRED ABSENCE OF BOTH CERVIX AND UTERUS: Chronic | ICD-10-CM

## 2020-09-20 DIAGNOSIS — I25.10 ATHEROSCLEROTIC HEART DISEASE OF NATIVE CORONARY ARTERY WITHOUT ANGINA PECTORIS: ICD-10-CM

## 2020-09-20 DIAGNOSIS — K31.9 DISEASE OF STOMACH AND DUODENUM, UNSPECIFIED: Chronic | ICD-10-CM

## 2020-09-20 LAB
ALBUMIN SERPL ELPH-MCNC: 3.5 G/DL — SIGNIFICANT CHANGE UP (ref 3.3–5.2)
ALP SERPL-CCNC: 49 U/L — SIGNIFICANT CHANGE UP (ref 40–120)
ALT FLD-CCNC: 17 U/L — SIGNIFICANT CHANGE UP
ANION GAP SERPL CALC-SCNC: 11 MMOL/L — SIGNIFICANT CHANGE UP (ref 5–17)
ANISOCYTOSIS BLD QL: SIGNIFICANT CHANGE UP
APTT BLD: 26.6 SEC — LOW (ref 27.5–35.5)
AST SERPL-CCNC: 20 U/L — SIGNIFICANT CHANGE UP
BASOPHILS # BLD AUTO: 0.04 K/UL — SIGNIFICANT CHANGE UP (ref 0–0.2)
BASOPHILS NFR BLD AUTO: 0.3 % — SIGNIFICANT CHANGE UP (ref 0–2)
BILIRUB SERPL-MCNC: <0.2 MG/DL — LOW (ref 0.4–2)
BLD GP AB SCN SERPL QL: SIGNIFICANT CHANGE UP
BUN SERPL-MCNC: 53 MG/DL — HIGH (ref 8–20)
CALCIUM SERPL-MCNC: 8.7 MG/DL — SIGNIFICANT CHANGE UP (ref 8.6–10.2)
CHLORIDE SERPL-SCNC: 100 MMOL/L — SIGNIFICANT CHANGE UP (ref 98–107)
CO2 SERPL-SCNC: 24 MMOL/L — SIGNIFICANT CHANGE UP (ref 22–29)
CREAT SERPL-MCNC: 0.91 MG/DL — SIGNIFICANT CHANGE UP (ref 0.5–1.3)
EOSINOPHIL # BLD AUTO: 0.08 K/UL — SIGNIFICANT CHANGE UP (ref 0–0.5)
EOSINOPHIL NFR BLD AUTO: 0.5 % — SIGNIFICANT CHANGE UP (ref 0–6)
GLUCOSE SERPL-MCNC: 156 MG/DL — HIGH (ref 70–99)
HCT VFR BLD CALC: 19.1 % — CRITICAL LOW (ref 34.5–45)
HCT VFR BLD CALC: 26.2 % — LOW (ref 34.5–45)
HGB BLD-MCNC: 6.4 G/DL — CRITICAL LOW (ref 11.5–15.5)
HGB BLD-MCNC: 8.8 G/DL — LOW (ref 11.5–15.5)
HYPOCHROMIA BLD QL: SLIGHT — SIGNIFICANT CHANGE UP
IMM GRANULOCYTES NFR BLD AUTO: 1.5 % — SIGNIFICANT CHANGE UP (ref 0–1.5)
INR BLD: 1.07 RATIO — SIGNIFICANT CHANGE UP (ref 0.88–1.16)
LACTATE BLDV-MCNC: 1.5 MMOL/L — SIGNIFICANT CHANGE UP (ref 0.5–2)
LYMPHOCYTES # BLD AUTO: 1.7 K/UL — SIGNIFICANT CHANGE UP (ref 1–3.3)
LYMPHOCYTES # BLD AUTO: 11.3 % — LOW (ref 13–44)
MACROCYTES BLD QL: SLIGHT — SIGNIFICANT CHANGE UP
MANUAL SMEAR VERIFICATION: SIGNIFICANT CHANGE UP
MCHC RBC-ENTMCNC: 30.3 PG — SIGNIFICANT CHANGE UP (ref 27–34)
MCHC RBC-ENTMCNC: 30.6 PG — SIGNIFICANT CHANGE UP (ref 27–34)
MCHC RBC-ENTMCNC: 33.5 GM/DL — SIGNIFICANT CHANGE UP (ref 32–36)
MCHC RBC-ENTMCNC: 33.6 GM/DL — SIGNIFICANT CHANGE UP (ref 32–36)
MCV RBC AUTO: 90.5 FL — SIGNIFICANT CHANGE UP (ref 80–100)
MCV RBC AUTO: 91 FL — SIGNIFICANT CHANGE UP (ref 80–100)
MICROCYTES BLD QL: SLIGHT — SIGNIFICANT CHANGE UP
MONOCYTES # BLD AUTO: 0.8 K/UL — SIGNIFICANT CHANGE UP (ref 0–0.9)
MONOCYTES NFR BLD AUTO: 5.3 % — SIGNIFICANT CHANGE UP (ref 2–14)
NEUTROPHILS # BLD AUTO: 12.23 K/UL — HIGH (ref 1.8–7.4)
NEUTROPHILS NFR BLD AUTO: 81.1 % — HIGH (ref 43–77)
OB PNL STL: POSITIVE
OVALOCYTES BLD QL SMEAR: SLIGHT — SIGNIFICANT CHANGE UP
PLAT MORPH BLD: NORMAL — SIGNIFICANT CHANGE UP
PLATELET # BLD AUTO: 300 K/UL — SIGNIFICANT CHANGE UP (ref 150–400)
PLATELET # BLD AUTO: 365 K/UL — SIGNIFICANT CHANGE UP (ref 150–400)
POIKILOCYTOSIS BLD QL AUTO: SLIGHT — SIGNIFICANT CHANGE UP
POLYCHROMASIA BLD QL SMEAR: SLIGHT — SIGNIFICANT CHANGE UP
POTASSIUM SERPL-MCNC: 3.9 MMOL/L — SIGNIFICANT CHANGE UP (ref 3.5–5.3)
POTASSIUM SERPL-SCNC: 3.9 MMOL/L — SIGNIFICANT CHANGE UP (ref 3.5–5.3)
PROT SERPL-MCNC: 5.7 G/DL — LOW (ref 6.6–8.7)
PROTHROM AB SERPL-ACNC: 12.4 SEC — SIGNIFICANT CHANGE UP (ref 10.6–13.6)
RBC # BLD: 2.11 M/UL — LOW (ref 3.8–5.2)
RBC # BLD: 2.88 M/UL — LOW (ref 3.8–5.2)
RBC # FLD: 13.7 % — SIGNIFICANT CHANGE UP (ref 10.3–14.5)
RBC # FLD: 13.8 % — SIGNIFICANT CHANGE UP (ref 10.3–14.5)
RBC BLD AUTO: ABNORMAL
SARS-COV-2 RNA SPEC QL NAA+PROBE: SIGNIFICANT CHANGE UP
SODIUM SERPL-SCNC: 135 MMOL/L — SIGNIFICANT CHANGE UP (ref 135–145)
WBC # BLD: 11.61 K/UL — HIGH (ref 3.8–10.5)
WBC # BLD: 15.07 K/UL — HIGH (ref 3.8–10.5)
WBC # FLD AUTO: 11.61 K/UL — HIGH (ref 3.8–10.5)
WBC # FLD AUTO: 15.07 K/UL — HIGH (ref 3.8–10.5)

## 2020-09-20 PROCEDURE — 93010 ELECTROCARDIOGRAM REPORT: CPT

## 2020-09-20 PROCEDURE — 99291 CRITICAL CARE FIRST HOUR: CPT | Mod: GC

## 2020-09-20 PROCEDURE — 99223 1ST HOSP IP/OBS HIGH 75: CPT

## 2020-09-20 RX ORDER — CHLORHEXIDINE GLUCONATE 213 G/1000ML
1 SOLUTION TOPICAL
Refills: 0 | Status: DISCONTINUED | OUTPATIENT
Start: 2020-09-20 | End: 2020-09-24

## 2020-09-20 RX ORDER — PANTOPRAZOLE SODIUM 20 MG/1
8 TABLET, DELAYED RELEASE ORAL
Qty: 80 | Refills: 0 | Status: DISCONTINUED | OUTPATIENT
Start: 2020-09-20 | End: 2020-09-23

## 2020-09-20 RX ORDER — PANTOPRAZOLE SODIUM 20 MG/1
80 TABLET, DELAYED RELEASE ORAL ONCE
Refills: 0 | Status: COMPLETED | OUTPATIENT
Start: 2020-09-20 | End: 2020-09-20

## 2020-09-20 RX ORDER — SODIUM CHLORIDE 9 MG/ML
1000 INJECTION INTRAMUSCULAR; INTRAVENOUS; SUBCUTANEOUS ONCE
Refills: 0 | Status: COMPLETED | OUTPATIENT
Start: 2020-09-20 | End: 2020-09-20

## 2020-09-20 RX ADMIN — SODIUM CHLORIDE 1000 MILLILITER(S): 9 INJECTION INTRAMUSCULAR; INTRAVENOUS; SUBCUTANEOUS at 14:36

## 2020-09-20 RX ADMIN — PANTOPRAZOLE SODIUM 10 MG/HR: 20 TABLET, DELAYED RELEASE ORAL at 13:19

## 2020-09-20 RX ADMIN — PANTOPRAZOLE SODIUM 80 MILLIGRAM(S): 20 TABLET, DELAYED RELEASE ORAL at 10:26

## 2020-09-20 RX ADMIN — SODIUM CHLORIDE 1000 MILLILITER(S): 9 INJECTION INTRAMUSCULAR; INTRAVENOUS; SUBCUTANEOUS at 11:57

## 2020-09-20 NOTE — ED PROVIDER NOTE - OBJECTIVE STATEMENT
87yo female with PMH GI bleed in past requiring MICU stay, Coronary Artery Disease, hyperlipidemia, HTN, presenting with black stools and generalized weakness. Patient states black stools started last night. No abd pain. Seen in ED 6 days ago for epigastric pain. No syncope, no chest pain or shorntess of breath. Takes ASA 81mg but no AC. 87yo female with PMH GI bleed in past requiring MICU stay, Coronary Artery Disease, hyperlipidemia, HTN, presenting with black stools and generalized weakness. Patient states black stools started last night. No abd pain. Seen in ED 6 days ago for epigastric pain. No syncope, no chest pain or shortness of breath. Takes ASA 81mg but no AC.

## 2020-09-20 NOTE — ED ADULT TRIAGE NOTE - CHIEF COMPLAINT QUOTE
Patient arrived to ED today weakness, SOB, black stools.  Patient hx of bleeding gastric ulcer, blood transfusions.  Patient was seen in ED last week she states and rectal bleeding has been persistent.  Patient pale and unable to obtain SpO2 reading.

## 2020-09-20 NOTE — ED PROVIDER NOTE - PHYSICAL EXAMINATION
Gen: NAD, pale  Head: NCAT  Lung: CTAB, no respiratory distress, no wheezing, rales, rhonchi  CV: normal s1/s2, rrr, no murmurs, Normal perfusion  Abd: soft, NTND  MSK: No edema, no visible deformities, full range of motion in all 4 extremities  Neuro: No focal neurologic deficits  Skin: No rash   Psych: normal affect Gen: NAD, pale  Head: NCAT  Lung: CTAB, no respiratory distress, no wheezing, rales, rhonchi  CV: normal s1/s2, rrr, no murmurs, Normal perfusion  Abd: soft, ND, tenderness epigastrum and suprapubic regions, nonperitonitic, no rebound, no guarding  MSK: No edema, no visible deformities, full range of motion in all 4 extremities  Neuro: No focal neurologic deficits  Skin: No rash   Psych: normal affect

## 2020-09-20 NOTE — ED PROVIDER NOTE - PROGRESS NOTE DETAILS
Oziel Cronin, Resident: Pt normotensive and feeling a little better. Spoke to Dr. Major, will scope tomorrow, recommends ICU consult. Will continue to closely monitor patient.

## 2020-09-20 NOTE — ED ADULT NURSE NOTE - OBJECTIVE STATEMENT
per pt she woke up with shortness of breath weakness and fatigue per pt had endoscopy with craterizations and per pt feels like it is the same pt was hypotensive at [facility] brought to critical room with daughter at bedside.  placed on monitor

## 2020-09-20 NOTE — ED ADULT NURSE NOTE - ALCOHOL PRE SCREEN (AUDIT - C)
Spoke to Mrs. England and informed her that the results of her biopsy returned as benign.  Mrs. England was thankful for the call     Statement Selected

## 2020-09-20 NOTE — CONSULT NOTE ADULT - SUBJECTIVE AND OBJECTIVE BOX
Patient is a 88y old  Female who presents with a chief complaint of black stools and abdominal pain and dizziness.    HPI: 89 y/o female presents with " black" stools this AM with associated upper abdominal pain and nausea. No hematemesis or hematochezia. She was here in the ED 6 days ago for nausea and abdominal pain and had a CT of the abdomen and pelvis that showed possible duodenitis of the third portion of the duodenum with a large hiatal hernia, She was last admitted her in 2019 for UGI bleeding where she had an EGD which showed a clean based duodenal ulcer at the duodenal sweep with a large hiatal hernia and a wide open Schatzki's ring. She last saw Dr. Delgadillo in our office thereafter and has not been seen by GI since. No NSAID use but she is on 81 mg. of ASA for CAD status post CABG in . No c/o chest apin or syncope now.      REVIEW OF SYSTEMS:  Constitutional: As per HPI. + fatigue and dizziness  ENMT:  No difficulty hearing, tinnitus, vertigo; No sinus or throat pain  Respiratory: No cough, wheezing, chills or hemoptysis  Cardiovascular: No chest pain, palpitations, or leg swelling  Gastrointestinal: As per HPI.   Skin: No itching, burning, rashes or lesions   Musculoskeletal: No joint pain or swelling; No muscle, back or extremity pain  Patient has no cardiopulmonary, peripheral vascular, musculoskeletal, dermatological, neurological, gynecological or psychological symptoms or complaints at this time  PAST MEDICAL & SURGICAL HISTORY:  Drop foot gait  left foot    Disc disorder of lumbar region    Hernia  hiatal, gastric hernias    Hernia, ventral    Rotator cuff (capsule) sprain    HLD (hyperlipidemia)    HTN (hypertension)    CAD (coronary artery disease)    Arthritis    S/P discectomy  lumbar-     Gastric disorder  gastric hernia repair- 3/16/12    S/P CABG x 3  11    S/P arthroscopy of shoulder  right rotator cuff repair-     S/P arthroscopy of shoulder  - left rotator cuff repair    S/P hysterectomy          FAMILY HISTORY:  Family history of lung cancer  son- small cell carcinoma  at 45 y/o    Family history of Hodgkin&#x27;s lymphoma (Child)  son  at 44.    Family history of Alzheimer&#x27;s disease  mother  at 93 complications of alzheimers    Family history of coronary artery disease  father  at 52 from MI        SOCIAL HISTORY:  Smoking Status: [ ] Current, [ x] Former, [ ] Never  Pack Years: < 10. Stopped smoking several years ago. No ETOH or drug abuse history.    MEDICATIONS:  MEDICATIONS  (STANDING):  sodium chloride 0.9% Bolus 1000 milliLiter(s) IV Bolus once    MEDICATIONS  (PRN):      Allergies    No Known Allergies    Intolerances        Vital Signs Last 24 Hrs  T(C): 36.7 (20 Sep 2020 09:40), Max: 36.7 (20 Sep 2020 09:40)  T(F): 98 (20 Sep 2020 09:40), Max: 98 (20 Sep 2020 09:40)  HR: 102 (20 Sep 2020 09:40) (102 - 102)  BP: 85/55 (20 Sep 2020 09:40) (85/55 - 85/55)  BP(mean): --  RR: 22 (20 Sep 2020 09:40) (22 - 22)  SpO2: --        PHYSICAL EXAM:    General: Well developed; pale appearing female, thin; in no acute distress when seen.  HEENT: MMM, conjunctiva pale and sclera anicteric.  Lungs: Clear bilaterally.  Cor: RRR S1, S2 only  Gastrointestinal: Abdomen: Soft, positive epigastric tenderness to moderate palpation non-distended; Normal bowel sounds; No rebound or guarding or palpable HSM.  KEATON: Decreased sphincter tone. No masses or tenderness. Dark brown non melenic stool in rectal vault.  Extremities: Normal range of motion, No clubbing, cyanosis or edema  Neurological: Alert and oriented x3  Skin: Warm and dry. No obvious rash      LABS:                        6.4    15.07 )-----------( 365      ( 20 Sep 2020 10:08 )             19.1     -20    135  |  100  |  53.0<H>  ----------------------------<  156<H>  3.9   |  24.0  |  0.91    Ca    8.7      20 Sep 2020 10:08    TPro  5.7<L>  /  Alb  3.5  /  TBili  <0.2<L>  /  DBili  x   /  AST  20  /  ALT  17  /  AlkPhos  49  09-20          RADIOLOGY & ADDITIONAL STUDIES:  CT of the abdomen and pelvis from  reviewed.

## 2020-09-20 NOTE — H&P ADULT - NSICDXFAMILYHX_GEN_ALL_CORE_FT
FAMILY HISTORY:  Family history of Alzheimer's disease, mother  at 93 complications of alzheimers  Family history of coronary artery disease, father  at 52 from MI  Family history of lung cancer, son- small cell carcinoma  at 45 y/o    Child  Still living? No  Family history of Hodgkin's lymphoma, Age at diagnosis: Age Unknown

## 2020-09-20 NOTE — ED PROVIDER NOTE - ATTENDING CONTRIBUTION TO CARE
HPI/ROS/PE/MDM by me      I performed a history and physical exam of the patient and discussed their management with the resident. I reviewed the resident's note and agree with the documented findings and plan of care. My medical decision making and observations are found above.

## 2020-09-20 NOTE — H&P ADULT - HISTORY OF PRESENT ILLNESS
Pt is an 87 y/o female h/o CAD/prior CABG, prior GI bleed, Mitral regurg, HTN, HLD presents with " black" stools this AM with associated upper abdominal pain that she describes as "hunger pain like cramps and nausea". No hematemesis or hematochezia. She was here in the ED 6 days ago for nausea and abdominal pain and had a CT of the abdomen and pelvis that showed possible duodenitis of the third portion of the duodenum with a large hiatal hernia, She was last admitted her in October 2019 for UGI bleeding where she had an EGD which showed a clean based duodenal ulcer at the duodenal sweep with a large hiatal hernia and a wide open Schatzki's ring. She last saw Dr. Delgadillo in our office thereafter and has not been seen by GI since. No NSAID use but she is on 81 mg. of ASA for CAD status post CABG in 2011. No c/o chest pain or syncope now.  Pt has received 1U PRBC and IVF in ER and she will be getting a second unit of PRBC, she will be admitted to ICU for resuscitation.

## 2020-09-20 NOTE — H&P ADULT - ATTENDING COMMENTS
I evaluated this patient and agree with above.  Given heart disease, valvular disease, acute hemoglobin drop, and presenting hypotension with known UGI pathology, will bring into MICU for resuscitation prior to EGD.  Updated daughter at bedside.

## 2020-09-20 NOTE — H&P ADULT - PROBLEM SELECTOR PLAN 1
volume resuscitation with PRBC and IVF  blood pressure responding   GI eval/EGD when stabilized and cardiac clearance  Protonix gtt

## 2020-09-20 NOTE — CONSULT NOTE ADULT - PROBLEM SELECTOR RECOMMENDATION 9
By history although pt. has no melenic stools at this time. With recent CT scan suggesting possible duodenitis +/or ulcer in the third portion of the duodenum and prior h/o duodenal ulcer in 2019 r/o recurrent duodenal ulcer +/or duodenitis. Transfuse to Hb of 8 grams or higher. IV Pantoprazole. Clear liquid diet. Cardiology evaluation and clearance for EGD tomorrow. Please obtain COVID nasal swab if not already done. Repeat labs ordered for the AM. Would admit to monitored bed but does not presently need MICU admission.

## 2020-09-20 NOTE — ED PROVIDER NOTE - CLINICAL SUMMARY MEDICAL DECISION MAKING FREE TEXT BOX
87yo F with generalized weakness, black stools, h/o GIB in past- 89yo F with generalized weakness, black stools, h/o GIB in past- 2u prbc's emergently release with improvement in BP. Seen by GI and MICU- will admit to MICU for further management. Elizabeth Christiansen DO

## 2020-09-20 NOTE — ED PROVIDER NOTE - FAMILY HISTORY
Family history of coronary artery disease, father  at 52 from MI     Family history of Alzheimer's disease, mother  at 93 complications of alzheimers     Family history of lung cancer, son- small cell carcinoma  at 45 y/o     Child  Still living? No  Family history of Hodgkin's lymphoma, Age at diagnosis: Age Unknown

## 2020-09-20 NOTE — CONSULT NOTE ADULT - SUBJECTIVE AND OBJECTIVE BOX
[facility]                                                    [address] , [address], [place] [address]                                                         PHONE: [phone]    FAX: [phone] [address], [address], [place] [address]                                                 PHONE: [phone]    FAX: [phone]  *******************************************************************************    Reason for Consult: Pre-operative cardiac risk stratification    HPI:  MICHAEL FOSTER is a 88y Female    PAST MEDICAL & SURGICAL HISTORY:  Drop foot gait  left foot    Disc disorder of lumbar region    Hernia  hiatal, gastric hernias    Hernia, ventral    Rotator cuff (capsule) sprain    HLD (hyperlipidemia)    HTN (hypertension)    CAD (coronary artery disease)    Arthritis    S/P discectomy  lumbar- 1996    Gastric disorder  gastric hernia repair- 3/16/12    S/P CABG x 3  8/18/11    S/P arthroscopy of shoulder  right rotator cuff repair- 2006    S/P arthroscopy of shoulder  2001- left rotator cuff repair    S/P hysterectomy  1972        No Known Allergies      MEDICATIONS  (STANDING):  chlorhexidine 4% Liquid 1 Application(s) Topical <User Schedule>  pantoprazole Infusion 8 mG/Hr (10 mL/Hr) IV Continuous <Continuous>    MEDICATIONS  (PRN):      Social History: no active tobacco / EtOH / IVDA    Family History: Family history of lung cancer    Family history of Hodgkin&#x27;s lymphoma (Child)    Family history of Alzheimer&#x27;s disease    Family history of coronary artery disease        ROS: As noted above, otherwise unremarkable.    Vital Signs Last 24 Hrs  T(C): 36.7 (20 Sep 2020 15:32), Max: 36.7 (20 Sep 2020 09:40)  T(F): 98 (20 Sep 2020 15:32), Max: 98 (20 Sep 2020 09:40)  HR: 74 (20 Sep 2020 17:00) (70 - 102)  BP: 107/59 (20 Sep 2020 17:00) (85/55 - 107/59)  BP(mean): --  RR: 18 (20 Sep 2020 17:00) (16 - 22)  SpO2: 95% (20 Sep 2020 17:00) (95% - 96%)    I&O's Detail    20 Sep 2020 07:01  -  20 Sep 2020 17:26  --------------------------------------------------------  IN:    Oral Fluid: 360 mL    Pantoprazole: 30 mL  Total IN: 390 mL    OUT:  Total OUT: 0 mL    Total NET: 390 mL        I&O's Summary    20 Sep 2020 07:01  -  20 Sep 2020 17:26  --------------------------------------------------------  IN: 390 mL / OUT: 0 mL / NET: 390 mL            PHYSICAL EXAM:  General: Appears well developed, well nourished, no acute distress  HEENT: Head: normocephalic, atraumatic  Eyes: Pupils equal and reactive  Neck: Supple, no carotid bruit, no JVD, no HJR  CARDIOVASCULAR: Normal S1 and S2, no murmur, rub, or gallop  LUNGS: Clear to auscultation bilaterally, no rales, rhonchi or wheeze  ABDOMEN: Soft, nontender, non-distended, positive bowel sounds, no mass or bruit  EXTREMITIES: No edema, distal pulses WNL  SKIN: Warm and dry with normal turgor  NEURO: Alert & oriented x 3, grossly intact  PSYCH: normal mood and affect    LABS:                        6.4    15.07 )-----------( 365      ( 20 Sep 2020 10:08 )             19.1     09-20    135  |  100  |  53.0<H>  ----------------------------<  156<H>  3.9   |  24.0  |  0.91    Ca    8.7      20 Sep 2020 10:08    TPro  5.7<L>  /  Alb  3.5  /  TBili  <0.2<L>  /  DBili  x   /  AST  20  /  ALT  17  /  AlkPhos  49  09-20        PT/INR - ( 20 Sep 2020 10:08 )   PT: 12.4 sec;   INR: 1.07 ratio         PTT - ( 20 Sep 2020 10:08 )  PTT:26.6 sec    RADIOLOGY & ADDITIONAL STUDIES:    ECG:    ECHO:    STRESS TEST:    CARDIAC CATHETERIZATION:    Assessment and Plan:  In summary, MICHAEL FOSTER is a 88y Female with past medical history significant for CABG HTN HL GIB p/w abd pain/melena    - No active chest pain, evidence of ischemia, decompensated CHF, significant valvular abnormality, or unstable arrhythmia and therefore no absolute cardiac contraindication to the planned surgical procedure and this may be performed as scheduled.  - asa held until GI procedure  - comfortable no cp or additional cv complaints    We will follow with you.  Thank you for allowing me to participate in the care of your patient.      Sincerely,    Dr. Enrique Kevin

## 2020-09-21 LAB
ANION GAP SERPL CALC-SCNC: 10 MMOL/L — SIGNIFICANT CHANGE UP (ref 5–17)
ANION GAP SERPL CALC-SCNC: 9 MMOL/L — SIGNIFICANT CHANGE UP (ref 5–17)
ANISOCYTOSIS BLD QL: SLIGHT — SIGNIFICANT CHANGE UP
BASOPHILS # BLD AUTO: 0.18 K/UL — SIGNIFICANT CHANGE UP (ref 0–0.2)
BASOPHILS NFR BLD AUTO: 1.7 % — SIGNIFICANT CHANGE UP (ref 0–2)
BUN SERPL-MCNC: 29 MG/DL — HIGH (ref 8–20)
BUN SERPL-MCNC: 35 MG/DL — HIGH (ref 8–20)
CALCIUM SERPL-MCNC: 8.2 MG/DL — LOW (ref 8.6–10.2)
CALCIUM SERPL-MCNC: 8.8 MG/DL — SIGNIFICANT CHANGE UP (ref 8.6–10.2)
CHLORIDE SERPL-SCNC: 106 MMOL/L — SIGNIFICANT CHANGE UP (ref 98–107)
CHLORIDE SERPL-SCNC: 106 MMOL/L — SIGNIFICANT CHANGE UP (ref 98–107)
CO2 SERPL-SCNC: 22 MMOL/L — SIGNIFICANT CHANGE UP (ref 22–29)
CO2 SERPL-SCNC: 24 MMOL/L — SIGNIFICANT CHANGE UP (ref 22–29)
CREAT SERPL-MCNC: 0.73 MG/DL — SIGNIFICANT CHANGE UP (ref 0.5–1.3)
CREAT SERPL-MCNC: 0.77 MG/DL — SIGNIFICANT CHANGE UP (ref 0.5–1.3)
ELLIPTOCYTES BLD QL SMEAR: SLIGHT — SIGNIFICANT CHANGE UP
EOSINOPHIL # BLD AUTO: 0 K/UL — SIGNIFICANT CHANGE UP (ref 0–0.5)
EOSINOPHIL NFR BLD AUTO: 0 % — SIGNIFICANT CHANGE UP (ref 0–6)
GIANT PLATELETS BLD QL SMEAR: PRESENT — SIGNIFICANT CHANGE UP
GLUCOSE SERPL-MCNC: 112 MG/DL — HIGH (ref 70–99)
GLUCOSE SERPL-MCNC: 118 MG/DL — HIGH (ref 70–99)
HCT VFR BLD CALC: 23.6 % — LOW (ref 34.5–45)
HCT VFR BLD CALC: 24.7 % — LOW (ref 34.5–45)
HCT VFR BLD CALC: 27 % — LOW (ref 34.5–45)
HGB BLD-MCNC: 8.1 G/DL — LOW (ref 11.5–15.5)
HGB BLD-MCNC: 8.3 G/DL — LOW (ref 11.5–15.5)
HGB BLD-MCNC: 9 G/DL — LOW (ref 11.5–15.5)
LYMPHOCYTES # BLD AUTO: 2.48 K/UL — SIGNIFICANT CHANGE UP (ref 1–3.3)
LYMPHOCYTES # BLD AUTO: 23.5 % — SIGNIFICANT CHANGE UP (ref 13–44)
MAGNESIUM SERPL-MCNC: 2.3 MG/DL — SIGNIFICANT CHANGE UP (ref 1.8–2.6)
MANUAL SMEAR VERIFICATION: SIGNIFICANT CHANGE UP
MCHC RBC-ENTMCNC: 30.6 PG — SIGNIFICANT CHANGE UP (ref 27–34)
MCHC RBC-ENTMCNC: 33.3 GM/DL — SIGNIFICANT CHANGE UP (ref 32–36)
MCHC RBC-ENTMCNC: 33.6 GM/DL — SIGNIFICANT CHANGE UP (ref 32–36)
MCHC RBC-ENTMCNC: 34.3 GM/DL — SIGNIFICANT CHANGE UP (ref 32–36)
MCV RBC AUTO: 89.1 FL — SIGNIFICANT CHANGE UP (ref 80–100)
MCV RBC AUTO: 91.1 FL — SIGNIFICANT CHANGE UP (ref 80–100)
MCV RBC AUTO: 91.8 FL — SIGNIFICANT CHANGE UP (ref 80–100)
MICROCYTES BLD QL: SLIGHT — SIGNIFICANT CHANGE UP
MONOCYTES # BLD AUTO: 0.82 K/UL — SIGNIFICANT CHANGE UP (ref 0–0.9)
MONOCYTES NFR BLD AUTO: 7.8 % — SIGNIFICANT CHANGE UP (ref 2–14)
NEUTROPHILS # BLD AUTO: 7.06 K/UL — SIGNIFICANT CHANGE UP (ref 1.8–7.4)
NEUTROPHILS NFR BLD AUTO: 67 % — SIGNIFICANT CHANGE UP (ref 43–77)
NRBC # BLD: 2 /100 — HIGH (ref 0–0)
OVALOCYTES BLD QL SMEAR: SLIGHT — SIGNIFICANT CHANGE UP
PHOSPHATE SERPL-MCNC: 2.5 MG/DL — SIGNIFICANT CHANGE UP (ref 2.4–4.7)
PLAT MORPH BLD: NORMAL — SIGNIFICANT CHANGE UP
PLATELET # BLD AUTO: 242 K/UL — SIGNIFICANT CHANGE UP (ref 150–400)
PLATELET # BLD AUTO: 277 K/UL — SIGNIFICANT CHANGE UP (ref 150–400)
PLATELET # BLD AUTO: 334 K/UL — SIGNIFICANT CHANGE UP (ref 150–400)
POIKILOCYTOSIS BLD QL AUTO: SLIGHT — SIGNIFICANT CHANGE UP
POLYCHROMASIA BLD QL SMEAR: SLIGHT — SIGNIFICANT CHANGE UP
POTASSIUM SERPL-MCNC: 4 MMOL/L — SIGNIFICANT CHANGE UP (ref 3.5–5.3)
POTASSIUM SERPL-MCNC: 4.1 MMOL/L — SIGNIFICANT CHANGE UP (ref 3.5–5.3)
POTASSIUM SERPL-SCNC: 4 MMOL/L — SIGNIFICANT CHANGE UP (ref 3.5–5.3)
POTASSIUM SERPL-SCNC: 4.1 MMOL/L — SIGNIFICANT CHANGE UP (ref 3.5–5.3)
RBC # BLD: 2.65 M/UL — LOW (ref 3.8–5.2)
RBC # BLD: 2.71 M/UL — LOW (ref 3.8–5.2)
RBC # BLD: 2.94 M/UL — LOW (ref 3.8–5.2)
RBC # FLD: 14.5 % — SIGNIFICANT CHANGE UP (ref 10.3–14.5)
RBC # FLD: 14.9 % — HIGH (ref 10.3–14.5)
RBC # FLD: 15 % — HIGH (ref 10.3–14.5)
RBC BLD AUTO: ABNORMAL
SODIUM SERPL-SCNC: 138 MMOL/L — SIGNIFICANT CHANGE UP (ref 135–145)
SODIUM SERPL-SCNC: 139 MMOL/L — SIGNIFICANT CHANGE UP (ref 135–145)
WBC # BLD: 10.54 K/UL — HIGH (ref 3.8–10.5)
WBC # BLD: 13.19 K/UL — HIGH (ref 3.8–10.5)
WBC # BLD: 16.82 K/UL — HIGH (ref 3.8–10.5)
WBC # FLD AUTO: 10.54 K/UL — HIGH (ref 3.8–10.5)
WBC # FLD AUTO: 13.19 K/UL — HIGH (ref 3.8–10.5)
WBC # FLD AUTO: 16.82 K/UL — HIGH (ref 3.8–10.5)

## 2020-09-21 PROCEDURE — 99233 SBSQ HOSP IP/OBS HIGH 50: CPT

## 2020-09-21 PROCEDURE — 43255 EGD CONTROL BLEEDING ANY: CPT

## 2020-09-21 RX ORDER — LANOLIN ALCOHOL/MO/W.PET/CERES
5 CREAM (GRAM) TOPICAL AT BEDTIME
Refills: 0 | Status: DISCONTINUED | OUTPATIENT
Start: 2020-09-21 | End: 2020-09-24

## 2020-09-21 RX ORDER — METOCLOPRAMIDE HCL 10 MG
10 TABLET ORAL ONCE
Refills: 0 | Status: COMPLETED | OUTPATIENT
Start: 2020-09-21 | End: 2020-09-21

## 2020-09-21 RX ADMIN — Medication 10 MILLIGRAM(S): at 14:12

## 2020-09-21 RX ADMIN — CHLORHEXIDINE GLUCONATE 1 APPLICATION(S): 213 SOLUTION TOPICAL at 06:47

## 2020-09-21 RX ADMIN — Medication 5 MILLIGRAM(S): at 22:16

## 2020-09-21 NOTE — BRIEF OPERATIVE NOTE - OPERATION/FINDINGS
EGD: Fresh blood in stomach. Actively bleeding duodenal ulcer with visible vessel. Attempted clip placement-unsuccessful. Injected epinephrine in 4 quadrants and bipolar cautery with Gold probe

## 2020-09-21 NOTE — PROGRESS NOTE ADULT - ATTENDING COMMENTS
A total of 45 minutes were spent on the entire encounter. Greater than 50% of the time was spent reviewing labs, notes, orders, radiographic studies, as well as counseling and coordinating care with the relevant multidisciplinary team, including with the primary and consulting providers.

## 2020-09-22 LAB
ANION GAP SERPL CALC-SCNC: 8 MMOL/L — SIGNIFICANT CHANGE UP (ref 5–17)
BUN SERPL-MCNC: 19 MG/DL — SIGNIFICANT CHANGE UP (ref 8–20)
CALCIUM SERPL-MCNC: 8.6 MG/DL — SIGNIFICANT CHANGE UP (ref 8.6–10.2)
CHLORIDE SERPL-SCNC: 107 MMOL/L — SIGNIFICANT CHANGE UP (ref 98–107)
CO2 SERPL-SCNC: 24 MMOL/L — SIGNIFICANT CHANGE UP (ref 22–29)
CREAT SERPL-MCNC: 0.79 MG/DL — SIGNIFICANT CHANGE UP (ref 0.5–1.3)
GLUCOSE SERPL-MCNC: 110 MG/DL — HIGH (ref 70–99)
HCT VFR BLD CALC: 22.3 % — LOW (ref 34.5–45)
HCT VFR BLD CALC: 28.4 % — LOW (ref 34.5–45)
HGB BLD-MCNC: 7.6 G/DL — LOW (ref 11.5–15.5)
HGB BLD-MCNC: 9.5 G/DL — LOW (ref 11.5–15.5)
MAGNESIUM SERPL-MCNC: 2.2 MG/DL — SIGNIFICANT CHANGE UP (ref 1.6–2.6)
MCHC RBC-ENTMCNC: 30.4 PG — SIGNIFICANT CHANGE UP (ref 27–34)
MCHC RBC-ENTMCNC: 30.8 PG — SIGNIFICANT CHANGE UP (ref 27–34)
MCHC RBC-ENTMCNC: 33.5 GM/DL — SIGNIFICANT CHANGE UP (ref 32–36)
MCHC RBC-ENTMCNC: 34.1 GM/DL — SIGNIFICANT CHANGE UP (ref 32–36)
MCV RBC AUTO: 90.3 FL — SIGNIFICANT CHANGE UP (ref 80–100)
MCV RBC AUTO: 91 FL — SIGNIFICANT CHANGE UP (ref 80–100)
NRBC # BLD: 1 /100 WBCS — HIGH (ref 0–0)
PHOSPHATE SERPL-MCNC: 2.7 MG/DL — SIGNIFICANT CHANGE UP (ref 2.4–4.7)
PLATELET # BLD AUTO: 323 K/UL — SIGNIFICANT CHANGE UP (ref 150–400)
PLATELET # BLD AUTO: 334 K/UL — SIGNIFICANT CHANGE UP (ref 150–400)
POTASSIUM SERPL-MCNC: 3.9 MMOL/L — SIGNIFICANT CHANGE UP (ref 3.5–5.3)
POTASSIUM SERPL-SCNC: 3.9 MMOL/L — SIGNIFICANT CHANGE UP (ref 3.5–5.3)
RBC # BLD: 2.47 M/UL — LOW (ref 3.8–5.2)
RBC # BLD: 3.12 M/UL — LOW (ref 3.8–5.2)
RBC # FLD: 15 % — HIGH (ref 10.3–14.5)
RBC # FLD: 15.2 % — HIGH (ref 10.3–14.5)
SODIUM SERPL-SCNC: 138 MMOL/L — SIGNIFICANT CHANGE UP (ref 135–145)
WBC # BLD: 10.26 K/UL — SIGNIFICANT CHANGE UP (ref 3.8–10.5)
WBC # BLD: 10.44 K/UL — SIGNIFICANT CHANGE UP (ref 3.8–10.5)
WBC # FLD AUTO: 10.26 K/UL — SIGNIFICANT CHANGE UP (ref 3.8–10.5)
WBC # FLD AUTO: 10.44 K/UL — SIGNIFICANT CHANGE UP (ref 3.8–10.5)

## 2020-09-22 PROCEDURE — 99233 SBSQ HOSP IP/OBS HIGH 50: CPT

## 2020-09-22 PROCEDURE — 99223 1ST HOSP IP/OBS HIGH 75: CPT

## 2020-09-22 RX ORDER — FUROSEMIDE 40 MG
20 TABLET ORAL
Qty: 0 | Refills: 0 | DISCHARGE

## 2020-09-22 RX ORDER — ASPIRIN/CALCIUM CARB/MAGNESIUM 324 MG
1 TABLET ORAL
Qty: 0 | Refills: 0 | DISCHARGE

## 2020-09-22 RX ORDER — ASCORBIC ACID 60 MG
1 TABLET,CHEWABLE ORAL
Qty: 0 | Refills: 0 | DISCHARGE

## 2020-09-22 RX ORDER — SIMVASTATIN 20 MG/1
20 TABLET, FILM COATED ORAL AT BEDTIME
Refills: 0 | Status: DISCONTINUED | OUTPATIENT
Start: 2020-09-22 | End: 2020-09-24

## 2020-09-22 RX ORDER — LANOLIN ALCOHOL/MO/W.PET/CERES
3 CREAM (GRAM) TOPICAL AT BEDTIME
Refills: 0 | Status: DISCONTINUED | OUTPATIENT
Start: 2020-09-22 | End: 2020-09-24

## 2020-09-22 RX ORDER — CARVEDILOL PHOSPHATE 80 MG/1
3.12 CAPSULE, EXTENDED RELEASE ORAL EVERY 12 HOURS
Refills: 0 | Status: DISCONTINUED | OUTPATIENT
Start: 2020-09-22 | End: 2020-09-24

## 2020-09-22 RX ADMIN — SIMVASTATIN 20 MILLIGRAM(S): 20 TABLET, FILM COATED ORAL at 22:18

## 2020-09-22 RX ADMIN — Medication 5 MILLIGRAM(S): at 22:17

## 2020-09-22 RX ADMIN — CARVEDILOL PHOSPHATE 3.12 MILLIGRAM(S): 80 CAPSULE, EXTENDED RELEASE ORAL at 17:50

## 2020-09-22 RX ADMIN — CHLORHEXIDINE GLUCONATE 1 APPLICATION(S): 213 SOLUTION TOPICAL at 12:51

## 2020-09-22 NOTE — PROGRESS NOTE ADULT - ATTENDING COMMENTS
A total of 35 minutes were spent on the entire encounter. Greater than 50% of the time was spent reviewing labs, notes, orders, radiographic studies, as well as counseling and coordinating care with the relevant multidisciplinary team, including with the primary and consulting providers.

## 2020-09-22 NOTE — DIETITIAN INITIAL EVALUATION ADULT. - CONTINUE CURRENT NUTRITION CARE PLAN
- advance diet as medically feasible/tolerable to DASH/TLC and add Ensure Enlive TID to optimize po intake and provide an additional 350 kcal, 20g protein per serving./yes

## 2020-09-22 NOTE — DIETITIAN INITIAL EVALUATION ADULT. - ETIOLOGY
related to inability to meet sufficient protein-energy in setting of advanced age, now with UGIB (visible ulcerative bleed in duodenum)

## 2020-09-22 NOTE — DIETITIAN INITIAL EVALUATION ADULT. - OTHER INFO
88 year old female PMH CAD s/p CABG, MR, h/o GIB, HTN, HLD who presents with black stools, nausea, abdominal pain, f/w UGIB with visible ulcerative bleed in duodenum on EGD 9/21. Pt reports overall good appetite/po intake PTA. Currently tolerating full liquid diet. Pt states she follows a healthy 88 year old female PMH CAD s/p CABG, MR, h/o GIB, HTN, HLD who presents with black stools, nausea, abdominal pain, f/w UGIB with visible ulcerative bleed in duodenum on EGD 9/21. Pt reports overall eating well PTA. Noted with nausea and abdominal pain PTA. States she typically follows a heart healthy diet. However, reports over the last few years has loss her sense of taste and smell. Per EMR review, pts weight ~one year ago was 135 lbs, current admission weight 121 lbs. Pt states she drinks one Ensure a day at home. Denies chewing/swallowing difficulty.

## 2020-09-22 NOTE — CONSULT NOTE ADULT - ASSESSMENT
Pt is an 89 y/o female h/o CAD/prior CABG, prior GI bleed, Mitral regurg, HTN, HLD presents with " black" stools with associated upper abdominal pain that she describes as "hunger pain like cramps and nausea".    # Active GIB  S/p EGD 9/21 showing visible ulcer in duodenum, active bleeding, s/p epi injection and cauterization.  Cont PPi GTT for 72 hrs and then change to PO  tolerating Full liq diet well. Cont same  GI appreciated    # acute anemia, normocytic  s/p PRBC x 3  monitor H/H  hold aspirin    # CAD, HTN  most meds on hold sec to soft BP on arrival  now stable  will begin resuming them as tolerated    ICDs    Plan- adv diet as tolerated complete PPI GTT  D/W ICU, GI Pt is an 87 y/o female h/o CAD/prior CABG, prior GI bleed, Mitral regurg, HTN, HLD presents with " black" stools with associated upper abdominal pain that she describes as "hunger pain like cramps and nausea".    # Active GIB  S/p EGD 9/21 showing visible ulcer in duodenum, active bleeding, s/p epi injection and cauterization.  Cont PPi GTT for 72 hrs and then change to PO  tolerating Full liq diet well. Cont same  GI appreciated    # acute anemia, normocytic  s/p PRBC x 3  monitor H/H  hold aspirin    # CAD, HTN  most meds on hold sec to soft BP on arrival  now stable  will begin resuming them as tolerated    # CKD stg 2  avoid nephrotoxics  renally dose meds    ICDs    Plan- adv diet as tolerated complete PPI GTT  D/W ICU, GI

## 2020-09-22 NOTE — CHART NOTE - TREATMENT: THE FOLLOWING DIET HAS BEEN RECOMMENDED
Advance diet as medically feasible/tolerable to DASH/TLC and add Ensure Enlive TID to optimize po intake and provide an additional 350 kcal, 20g protein per serving.   Rx: MVI and vit C 500mg daily.   Encourage po intake, monitor diet tolerance, and provide assistance at meals as needed.   Obtain daily weights to monitor trends.

## 2020-09-22 NOTE — DIETITIAN INITIAL EVALUATION ADULT. - PERTINENT LABORATORY DATA
09-22 Na138 mmol/L Glu 110 mg/dL<H> K+ 3.9 mmol/L Cr  0.79 mg/dL BUN 19.0 mg/dL Phos 2.7 mg/dL Alb n/a   PAB n/a

## 2020-09-22 NOTE — CONSULT NOTE ADULT - SUBJECTIVE AND OBJECTIVE BOX
MICU DOWNGRADE ACCEPTANCE NOTE    HPI: on   Pt is an 87 y/o female h/o CAD/prior CABG, prior GI bleed, Mitral regurg, HTN, HLD presents with " black" stools with associated upper abdominal pain that she describes as "hunger pain like cramps and nausea".  She was here in the ED 6 days PRIOR for nausea and abdominal pain and had a CT of the abdomen and pelvis that showed possible duodenitis of the third portion of the duodenum with a large hiatal hernia, She was last admitted her in 2019 for UGI bleeding where she had an EGD which showed a clean based duodenal ulcer at the duodenal sweep with a large hiatal hernia and a wide open Schatzki's ring. She last saw Dr. Delgadillo in office thereafter and has not been seen by GI since. No NSAID use but she is on 81 mg of ASA for CAD status post CABG in .  Pt has received 1U PRBC and IVF in ER and she will be getting a second unit of PRBC, she will be admitted to ICU for resuscitation.    COURSE-   Pt was admitted to MICU . received  3 units PRBC. S/p EGD  showing visible ulcer in duodenum, active bleeding, s/p epi injection and cauterization.    PAST MEDICAL & SURGICAL HISTORY:  Drop foot gait left foot  Disc disorder of lumbar region  Hernia  hiatal, gastric hernias  Hernia, ventral  Rotator cuff (capsule) sprain  HLD (hyperlipidemia)  HTN (hypertension)  CAD (coronary artery disease)  Arthritis  S/P discectomy  Lumbar-   Gastric disorder  gastric hernia repair- 3/16/12  S/P CABG x 3  11  S/P arthroscopy of shoulder  right rotator cuff repair-   S/P arthroscopy of shoulder  - left rotator cuff repair  S/P hysterectomy      MEDICATIONS  (STANDING):  chlorhexidine 4% Liquid 1 Application(s) Topical <User Schedule>  melatonin 5 milliGRAM(s) Oral at bedtime  melatonin 3 milliGRAM(s) Oral at bedtime  pantoprazole Infusion 8 mG/Hr (10 mL/Hr) IV Continuous <Continuous>  simvastatin 20 milliGRAM(s) Oral at bedtime    MEDICATIONS  (PRN):  Allergies  No Known Allergies    SOCIAL HISTORY:  Denies habits    FAMILY HISTORY:  Family history of lung cancer  son- small cell carcinoma  at 43 y/o  Family history of Hodgkin&#x27;s lymphoma (Child)  son  at 44.  Family history of Alzheimer&#x27;s disease  mother  at 93 complications of alzheimers  Family history of coronary artery disease  father  at 52 from MI    Vital Signs Last 24 Hrs  T(C): 36.6 (22 Sep 2020 16:00), Max: 37.1 (21 Sep 2020 23:40)  T(F): 97.9 (22 Sep 2020 16:00), Max: 98.7 (21 Sep 2020 23:40)  HR: 78 (22 Sep 2020 12:00) (62 - 92)  BP: 108/81 (22 Sep 2020 12:00) (86/45 - 132/60)  BP(mean): 88 (22 Sep 2020 12:00) (61 - 88)  RR: 15 (22 Sep 2020 12:00) (15 - 20)  SpO2: 100% (22 Sep 2020 12:00) (78% - 100%)    REVIEW OF SYSTEMS:    as above    PHYSICAL EXAM:    GENERAL: Comfortable  HEAD:  Atraumatic, Normocephalic  EYES: EOMI, conjunctiva and sclera clear  ENT:  Moist mucous membranes, No lesions  NECK: Supple, No JVD, Normal thyroid  NERVOUS SYSTEM:  Alert & Oriented X 3, Good concentration; Moving all extremities well  CHEST/LUNG: CTA bilaterally; No rales, rhonchi, wheezing, or rubs  HEART: Regular rate and rhythm; No murmurs, rubs, or gallops  ABDOMEN: Soft, Nontender, Nondistended; Bowel sounds present  EXTREMITIES:  2+ Peripheral Pulses, No clubbing, cyanosis, or edema  SKIN: No rashes or lesions      LABS:                        9.5    10.26 )-----------( 334      ( 22 Sep 2020 13:45 )             28.4         138  |  107  |  19.0  ----------------------------<  110<H>  3.9   |  24.0  |  0.79    Ca    8.6      22 Sep 2020 05:21  Phos  2.7       Mg     2.2          MICU DOWNGRADE ACCEPTANCE NOTE    HPI: on   Pt is an 89 y/o female h/o CAD/prior CABG, prior GI bleed, Mitral regurg, HTN, HLD presents with " black" stools with associated upper abdominal pain that she describes as "hunger pain like cramps and nausea".  She was here in the ED 6 days PRIOR for nausea and abdominal pain and had a CT of the abdomen and pelvis that showed possible duodenitis of the third portion of the duodenum with a large hiatal hernia, She was last admitted her in 2019 for UGI bleeding where she had an EGD which showed a clean based duodenal ulcer at the duodenal sweep with a large hiatal hernia and a wide open Schatzki's ring. She last saw Dr. Delgadillo in office thereafter and has not been seen by GI since. No NSAID use but she is on 81 mg of ASA for CAD status post CABG in .  Pt has received 1U PRBC and IVF in ER and she will be getting a second unit of PRBC, she will be admitted to ICU for resuscitation.    COURSE-   Pt was admitted to MICU . received  3 units PRBC. S/p EGD  showing visible ulcer in duodenum, active bleeding, s/p epi injection and cauterization.    PAST MEDICAL & SURGICAL HISTORY:  Drop foot gait left foot  Disc disorder of lumbar region  Hernia  hiatal, gastric hernias  Hernia, ventral  Rotator cuff (capsule) sprain  HLD (hyperlipidemia)  HTN (hypertension)  CAD (coronary artery disease)  Arthritis  S/P discectomy  Lumbar-   Gastric disorder  gastric hernia repair- 3/16/12  S/P CABG x 3  11  S/P arthroscopy of shoulder  right rotator cuff repair-   S/P arthroscopy of shoulder  - left rotator cuff repair  S/P hysterectomy      MEDICATIONS  (STANDING):  chlorhexidine 4% Liquid 1 Application(s) Topical <User Schedule>  melatonin 5 milliGRAM(s) Oral at bedtime  melatonin 3 milliGRAM(s) Oral at bedtime  pantoprazole Infusion 8 mG/Hr (10 mL/Hr) IV Continuous <Continuous>  simvastatin 20 milliGRAM(s) Oral at bedtime    MEDICATIONS  (PRN):  Allergies  No Known Allergies    SOCIAL HISTORY:  Denies habits    FAMILY HISTORY:  Family history of lung cancer  son- small cell carcinoma  at 43 y/o  Family history of Hodgkin&#x27;s lymphoma (Child)  son  at 44.  Family history of Alzheimer&#x27;s disease  mother  at 93 complications of alzheimers  Family history of coronary artery disease  father  at 52 from MI    Vital Signs Last 24 Hrs  T(C): 36.6 (22 Sep 2020 16:00), Max: 37.1 (21 Sep 2020 23:40)  T(F): 97.9 (22 Sep 2020 16:00), Max: 98.7 (21 Sep 2020 23:40)  HR: 78 (22 Sep 2020 12:00) (62 - 92)  BP: 108/81 (22 Sep 2020 12:00) (86/45 - 132/60)  BP(mean): 88 (22 Sep 2020 12:00) (61 - 88)  RR: 15 (22 Sep 2020 12:00) (15 - 20)  SpO2: 100% (22 Sep 2020 12:00) (78% - 100%)    SUBJECTIVE:  comfortable  tolerating liquids well  no complaints    REVIEW OF SYSTEMS:    as above    PHYSICAL EXAM:    GENERAL: Comfortable  HEAD:  Atraumatic, Normocephalic  EYES: EOMI, conjunctiva and sclera clear  ENT:  Moist mucous membranes, No lesions  NECK: Supple, No JVD, Normal thyroid  NERVOUS SYSTEM:  Alert & Oriented X 3, Good concentration; Moving all extremities well  CHEST/LUNG: CTA bilaterally; No rales, rhonchi, wheezing, or rubs  HEART: Regular rate and rhythm; No murmurs, rubs, or gallops  ABDOMEN: Soft, Nontender, Nondistended; Bowel sounds present  EXTREMITIES:  2+ Peripheral Pulses, No clubbing, cyanosis, or edema  SKIN: No rashes or lesions      LABS:                        9.5    10.26 )-----------( 334      ( 22 Sep 2020 13:45 )             28.4         138  |  107  |  19.0  ----------------------------<  110<H>  3.9   |  24.0  |  0.79    Ca    8.6      22 Sep 2020 05:21  Phos  2.7       Mg     2.2

## 2020-09-22 NOTE — PROCEDURE NOTE - NSPROCDETAILS_GEN_ALL_CORE
dressing applied/secured in place/location identified, draped/prepped, sterile technique used/blood seen on insertion/sterile technique, catheter placed/ultrasound utilization/flushes easily

## 2020-09-22 NOTE — DIETITIAN INITIAL EVALUATION ADULT. - PERTINENT MEDS FT
MEDICATIONS  (STANDING):  chlorhexidine 4% Liquid 1 Application(s) Topical <User Schedule>  melatonin 5 milliGRAM(s) Oral at bedtime  pantoprazole Infusion 8 mG/Hr (10 mL/Hr) IV Continuous <Continuous>  simvastatin 20 milliGRAM(s) Oral at bedtime    MEDICATIONS  (PRN):

## 2020-09-23 ENCOUNTER — TRANSCRIPTION ENCOUNTER (OUTPATIENT)
Age: 85
End: 2020-09-23

## 2020-09-23 LAB
ANION GAP SERPL CALC-SCNC: 10 MMOL/L — SIGNIFICANT CHANGE UP (ref 5–17)
BUN SERPL-MCNC: 20 MG/DL — SIGNIFICANT CHANGE UP (ref 8–20)
CALCIUM SERPL-MCNC: 8.7 MG/DL — SIGNIFICANT CHANGE UP (ref 8.6–10.2)
CHLORIDE SERPL-SCNC: 107 MMOL/L — SIGNIFICANT CHANGE UP (ref 98–107)
CO2 SERPL-SCNC: 24 MMOL/L — SIGNIFICANT CHANGE UP (ref 22–29)
CREAT SERPL-MCNC: 0.8 MG/DL — SIGNIFICANT CHANGE UP (ref 0.5–1.3)
GLUCOSE SERPL-MCNC: 120 MG/DL — HIGH (ref 70–99)
HCT VFR BLD CALC: 29 % — LOW (ref 34.5–45)
HGB BLD-MCNC: 9.6 G/DL — LOW (ref 11.5–15.5)
MCHC RBC-ENTMCNC: 30.1 PG — SIGNIFICANT CHANGE UP (ref 27–34)
MCHC RBC-ENTMCNC: 33.1 GM/DL — SIGNIFICANT CHANGE UP (ref 32–36)
MCV RBC AUTO: 90.9 FL — SIGNIFICANT CHANGE UP (ref 80–100)
PLATELET # BLD AUTO: 336 K/UL — SIGNIFICANT CHANGE UP (ref 150–400)
POTASSIUM SERPL-MCNC: 4.3 MMOL/L — SIGNIFICANT CHANGE UP (ref 3.5–5.3)
POTASSIUM SERPL-SCNC: 4.3 MMOL/L — SIGNIFICANT CHANGE UP (ref 3.5–5.3)
RBC # BLD: 3.19 M/UL — LOW (ref 3.8–5.2)
RBC # FLD: 16 % — HIGH (ref 10.3–14.5)
SODIUM SERPL-SCNC: 141 MMOL/L — SIGNIFICANT CHANGE UP (ref 135–145)
WBC # BLD: 7.8 K/UL — SIGNIFICANT CHANGE UP (ref 3.8–10.5)
WBC # FLD AUTO: 7.8 K/UL — SIGNIFICANT CHANGE UP (ref 3.8–10.5)

## 2020-09-23 PROCEDURE — 99232 SBSQ HOSP IP/OBS MODERATE 35: CPT

## 2020-09-23 PROCEDURE — 99233 SBSQ HOSP IP/OBS HIGH 50: CPT

## 2020-09-23 RX ORDER — PANTOPRAZOLE SODIUM 20 MG/1
40 TABLET, DELAYED RELEASE ORAL
Refills: 0 | Status: DISCONTINUED | OUTPATIENT
Start: 2020-09-23 | End: 2020-09-24

## 2020-09-23 RX ORDER — LANOLIN ALCOHOL/MO/W.PET/CERES
1 CREAM (GRAM) TOPICAL
Qty: 0 | Refills: 0 | DISCHARGE
Start: 2020-09-23

## 2020-09-23 RX ORDER — PANTOPRAZOLE SODIUM 20 MG/1
1 TABLET, DELAYED RELEASE ORAL
Qty: 0 | Refills: 0 | DISCHARGE
Start: 2020-09-23

## 2020-09-23 RX ORDER — ASPIRIN/CALCIUM CARB/MAGNESIUM 324 MG
81 TABLET ORAL DAILY
Refills: 0 | Status: DISCONTINUED | OUTPATIENT
Start: 2020-09-23 | End: 2020-09-24

## 2020-09-23 RX ADMIN — CHLORHEXIDINE GLUCONATE 1 APPLICATION(S): 213 SOLUTION TOPICAL at 05:50

## 2020-09-23 RX ADMIN — PANTOPRAZOLE SODIUM 40 MILLIGRAM(S): 20 TABLET, DELAYED RELEASE ORAL at 17:25

## 2020-09-23 RX ADMIN — CARVEDILOL PHOSPHATE 3.12 MILLIGRAM(S): 80 CAPSULE, EXTENDED RELEASE ORAL at 05:49

## 2020-09-23 RX ADMIN — Medication 81 MILLIGRAM(S): at 22:36

## 2020-09-23 RX ADMIN — CARVEDILOL PHOSPHATE 3.12 MILLIGRAM(S): 80 CAPSULE, EXTENDED RELEASE ORAL at 17:25

## 2020-09-23 RX ADMIN — Medication 5 MILLIGRAM(S): at 22:36

## 2020-09-23 RX ADMIN — Medication 3 MILLIGRAM(S): at 22:36

## 2020-09-23 RX ADMIN — SIMVASTATIN 20 MILLIGRAM(S): 20 TABLET, FILM COATED ORAL at 22:35

## 2020-09-23 NOTE — PHYSICAL THERAPY INITIAL EVALUATION ADULT - GAIT DEVIATIONS NOTED, PT EVAL
left foot drop, increased left hip flexion to compensate/decreased step length/decreased christa/decreased stride length

## 2020-09-23 NOTE — PHYSICAL THERAPY INITIAL EVALUATION ADULT - ADDITIONAL COMMENTS
Pt reports living in a condo apartment with 1 step to enter no rails, and 6 steps + platform + 6 steps inside with bilateral handrails. Pt reports being independent with mobility & ADLs prior to admission. Reports she has family local however they all work full time no one is available to assist 24/7 if needed. Pt reports owning only a cane but does not use it. Pt does not drive/work. Denies any recent falls.

## 2020-09-23 NOTE — PHYSICAL THERAPY INITIAL EVALUATION ADULT - GENERAL OBSERVATIONS, REHAB EVAL
Pt received in bed + IV Loc, +Tele, breathing on RA in NAD, in 0/10 pain, agreeable to PT evaluation

## 2020-09-23 NOTE — DISCHARGE NOTE PROVIDER - CARE PROVIDER_API CALL
Alex Delgadillo  GASTROENTEROLOGY  [address] [facility] [facility] [address]  [place] [address]  Phone: [phone]  Fax: [phone]  Established Patient  Follow Up Time: 1 week

## 2020-09-23 NOTE — PHYSICAL THERAPY INITIAL EVALUATION ADULT - PERSONAL SAFETY AND JUDGMENT, REHAB EVAL
at risk behaviors demonstrated/impaired/Pt at times demonstrates being a "furniture walker". Pt was edcuated on how that could potentially increase her fall risk. Pt verbalized understanding.

## 2020-09-23 NOTE — DISCHARGE NOTE PROVIDER - HOSPITAL COURSE
Pt is an 89 y/o female h/o CAD/prior CABG, prior GI bleed, Mitral regurg, HTN, HLD admitted black" stools with associated upper abdominal pain that she describes as "hunger pain like cramps and nausea". Patient had recent visit to ED and 6 days ago and  prior for nausea and abdominal pain with CT of the abdomen and pelvis that showed possible duodenitis of the third portion of the duodenum with a large hiatal hernia. Has history of admission in October 2019 for UGI bleeding where she had an EGD which showed a clean based duodenal ulcer at the duodenal sweep with a large hiatal hernia and a wide open Schatzki's ring.     Pt was admitted MICU during which she received  3 units PRBC. S/p EGD 9/21 showed visible ulcer in duodenum, active bleeding, s/p epi injection and cauterization.                                 COURSE-   Pt was admitted to    Pt is an 87 y/o female h/o CAD/prior CABG, prior GI bleed, Mitral regurg, HTN, HLD admitted with black" stools, with associated upper abdominal pain that she describes as "hunger pain like cramps and nausea". Patient had recent visit to ED and 6 days ago and  prior for nausea and abdominal pain with CT of the abdomen and pelvis that showed possible duodenitis of the third portion of the duodenum with a large hiatal hernia. Has history of admission in October 2019 for UGI bleeding where she had an EGD which showed a clean based duodenal ulcer at the duodenal sweep with a large hiatal hernia and a wide open Schatzki's ring.     Pt was admitted MICU during which she received  3 units PRBC, later downgraded to the medicine unit. Patient was seen and evaluated by GI consult and their inputs appreciated. An EGD 9/21/20 showed visible ulcer in duodenum, active bleeding, s/p epi injection and cauterization. Pt was infused with IV PPI, and later transitioned to PO PPI. Patient was also assessed with concern for moderate protein-calorie malnutrition, and diet advanced to include Nutritional supplements, ensure live three times daily. The patient has improved, and is medically stable for discharge. The patient will follow up outpatient with GI, Dr Delgadillo.     Physical Exam:   Vital Signs Last 24 Hrs  T(C): 36.5 (23 Sep 2020 05:08), Max: 37.1 (23 Sep 2020 00:39)  T(F): 97.7 (23 Sep 2020 05:08), Max: 98.8 (23 Sep 2020 00:39)  HR: 60 (23 Sep 2020 05:08) (56 - 77)  BP: 112/66 (23 Sep 2020 05:08) (104/53 - 127/59)  BP(mean): 83 (23 Sep 2020 00:00) (76 - 83)  RR: 18 (23 Sep 2020 05:08) (16 - 23)  SpO2: 98% (23 Sep 2020 05:08) (79% - 100%)    · General: Well-developed, well nourished, alert and oriented x4  · Eye: EOMI; PERRL; no drainage or redness  · ENM: No oral lesions; no gross abnormalities  · Neck: No bruits; no thyromegaly or nodules  · Back: No deformity or limitation of movement  · Respiratory: Breath Sounds equal & clear to percussion & auscultation, no accessory muscle use  · Cardiovascular: Regular rate & rhythm, normal S1, S2; no murmurs, gallops or rubs; no S3, S4  · Gastrointestinal: Soft, non-tender, non-distended, no hepatosplenomegaly, normal bowel sounds  . Skin: no cyanosis, rash, lesions  . Extremities: No pedal edema, FROM in all extrem.  . Neuro: No focal deficits grossly noted         Pt is an 87 y/o female h/o CAD/prior CABG, prior GI bleed, Mitral regurg, HTN, HLD admitted with black" stools, with associated upper abdominal pain that she describes as "hunger pain like cramps and nausea". Patient had recent visit to ED and 6 days ago and  prior for nausea and abdominal pain with CT of the abdomen and pelvis that showed possible duodenitis of the third portion of the duodenum with a large hiatal hernia. Has history of admission in October 2019 for UGI bleeding where she had an EGD which showed a clean based duodenal ulcer at the duodenal sweep with a large hiatal hernia and a wide open Schatzki's ring.     Pt was admitted to MICU and started on PPI infusion and well as received 3 units PRBC. EGD 9/21/20 showed visible ulcer in duodenum, active bleeding, s/p epi injection and cauterization. Her acute blood loss anemia is stable and she is tolerating diet and switched to PO Protonix     Patient was also assessed with concern for moderate protein-calorie malnutrition, and diet advanced to include Nutritional supplements, ensure live three times daily. The patient has improved, and is medically stable for discharge. The patient will follow up outpatient with GI, Dr Delgadillo.     Physical Exam:   Vital Signs Last 24 Hrs  T(C): 36.5 (23 Sep 2020 05:08), Max: 37.1 (23 Sep 2020 00:39)  T(F): 97.7 (23 Sep 2020 05:08), Max: 98.8 (23 Sep 2020 00:39)  HR: 60 (23 Sep 2020 05:08) (56 - 77)  BP: 112/66 (23 Sep 2020 05:08) (104/53 - 127/59)  BP(mean): 83 (23 Sep 2020 00:00) (76 - 83)  RR: 18 (23 Sep 2020 05:08) (16 - 23)  SpO2: 98% (23 Sep 2020 05:08) (79% - 100%)    · General: Well-developed, well nourished, alert and oriented x4  · Eye: EOMI; PERRL; no drainage or redness  · ENM: No oral lesions; no gross abnormalities  · Neck: No bruits; no thyromegaly or nodules  · Back: No deformity or limitation of movement  · Respiratory: Breath Sounds equal & clear to percussion & auscultation, no accessory muscle use  · Cardiovascular: Regular rate & rhythm, normal S1, S2; no murmurs, gallops or rubs; no S3, S4  · Gastrointestinal: Soft, non-tender, non-distended, no hepatosplenomegaly, normal bowel sounds  . Skin: no cyanosis, rash, lesions  . Extremities: No pedal edema, FROM in all extrem.  . Neuro: No focal deficits grossly noted

## 2020-09-23 NOTE — DISCHARGE NOTE PROVIDER - NSDCMRMEDTOKEN_GEN_ALL_CORE_FT
ascorbic acid 500 mg oral tablet: 1 tab(s) orally once a day  aspirin 81 mg oral tablet, chewable: 1 tab(s) orally once a day  Carafate 1 g oral tablet: 1 tab(s) orally 4 times a day, As Needed -for moderate pain   carvedilol 3.125 mg oral tablet: 1 tab(s) orally 2 times a day  gets from [facility]  Colace sodium 100 mg oral capsule: 1 cap(s) orally 2 times a day  enalapril 2.5 mg oral tablet: 1 tab(s) orally once a day  gets from [facility]  furosemide: 20 milligram(s) orally once a day  pantoprazole 40 mg oral delayed release tablet: 1 tab(s) orally 2 times a day  gets from [facility]  simvastatin 20 mg oral tablet: 1 tab(s) orally once a day (at bedtime)  gets from [facility]   ascorbic acid 500 mg oral tablet: 1 tab(s) orally once a day  aspirin 81 mg oral tablet, chewable: 1 tab(s) orally once a day  Carafate 1 g oral tablet: 1 tab(s) orally 4 times a day, As Needed -for moderate pain   carvedilol 3.125 mg oral tablet: 1 tab(s) orally 2 times a day  gets from [facility]  Colace sodium 100 mg oral capsule: 1 cap(s) orally 2 times a day  enalapril 2.5 mg oral tablet: 1 tab(s) orally once a day  gets from [facility]  furosemide: 20 milligram(s) orally once a day  melatonin 3 mg oral tablet: 1 tab(s) orally once a day (at bedtime)  melatonin 5 mg oral tablet: 1 tab(s) orally once a day (at bedtime)  pantoprazole 40 mg oral delayed release tablet: 1 tab(s) orally 2 times a day  simvastatin 20 mg oral tablet: 1 tab(s) orally once a day (at bedtime)  gets from [facility]   ascorbic acid 500 mg oral tablet: 1 tab(s) orally once a day  aspirin 81 mg oral tablet, chewable: 1 tab(s) orally once a day  Carafate 1 g oral tablet: 1 tab(s) orally 4 times a day, As Needed -for moderate pain   carvedilol 3.125 mg oral tablet: 1 tab(s) orally 2 times a day  gets from [facility]  Colace sodium 100 mg oral capsule: 1 cap(s) orally 2 times a day  enalapril 2.5 mg oral tablet: 1 tab(s) orally once a day  gets from [facility]  furosemide: 20 milligram(s) orally once a day  melatonin 5 mg oral tablet: 1 tab(s) orally once a day (at bedtime)  pantoprazole 40 mg oral delayed release tablet: 1 tab(s) orally 2 times a day  simvastatin 20 mg oral tablet: 1 tab(s) orally once a day (at bedtime)  gets from [facility]

## 2020-09-23 NOTE — PHYSICAL THERAPY INITIAL EVALUATION ADULT - PERTINENT HX OF CURRENT PROBLEM, REHAB EVAL
Pt is an 89 y/o female h/o CAD/prior CABG, prior GI bleed, Mitral regurg, HTN, HLD presents with " black" stools with associated upper abdominal pain that she describes as "hunger pain like cramps and nausea". S/p EGD 9/21 showing visible ulcer in duodenum

## 2020-09-23 NOTE — DISCHARGE NOTE PROVIDER - NSDCCPCAREPLAN_GEN_ALL_CORE_FT
PRINCIPAL DISCHARGE DIAGNOSIS  Diagnosis: GIB (gastrointestinal bleeding)  Assessment and Plan of Treatment: Bleeding duodenal ulcer seen on EGD 9/21/20   -Pantoprazole 40mg orally two times daily  -Follow up with your GI doctor,  Dr Delgadillo       PRINCIPAL DISCHARGE DIAGNOSIS  Diagnosis: Bleeding duodenal ulcer  Assessment and Plan of Treatment: Medications as prescribed  Follow up with GI      SECONDARY DISCHARGE DIAGNOSES  Diagnosis: Acute blood loss anemia  Assessment and Plan of Treatment: Stable after transfusion  Follow up bloodwork at PMD office    Diagnosis: Coronary artery disease, angina presence unspecified, unspecified vessel or lesion type, unspecified whether native or transplanted heart  Assessment and Plan of Treatment: May resume ASA  Follow up with Cardiology

## 2020-09-24 ENCOUNTER — TRANSCRIPTION ENCOUNTER (OUTPATIENT)
Age: 85
End: 2020-09-24

## 2020-09-24 VITALS
DIASTOLIC BLOOD PRESSURE: 65 MMHG | TEMPERATURE: 98 F | SYSTOLIC BLOOD PRESSURE: 118 MMHG | HEART RATE: 67 BPM | OXYGEN SATURATION: 96 % | RESPIRATION RATE: 18 BRPM

## 2020-09-24 DIAGNOSIS — K26.4 CHRONIC OR UNSPECIFIED DUODENAL ULCER WITH HEMORRHAGE: ICD-10-CM

## 2020-09-24 LAB
ANION GAP SERPL CALC-SCNC: 10 MMOL/L — SIGNIFICANT CHANGE UP (ref 5–17)
BUN SERPL-MCNC: 23 MG/DL — HIGH (ref 8–20)
CALCIUM SERPL-MCNC: 9 MG/DL — SIGNIFICANT CHANGE UP (ref 8.6–10.2)
CHLORIDE SERPL-SCNC: 104 MMOL/L — SIGNIFICANT CHANGE UP (ref 98–107)
CO2 SERPL-SCNC: 27 MMOL/L — SIGNIFICANT CHANGE UP (ref 22–29)
CREAT SERPL-MCNC: 0.87 MG/DL — SIGNIFICANT CHANGE UP (ref 0.5–1.3)
GLUCOSE SERPL-MCNC: 112 MG/DL — HIGH (ref 70–99)
HCT VFR BLD CALC: 28.9 % — LOW (ref 34.5–45)
HGB BLD-MCNC: 9.4 G/DL — LOW (ref 11.5–15.5)
MCHC RBC-ENTMCNC: 30 PG — SIGNIFICANT CHANGE UP (ref 27–34)
MCHC RBC-ENTMCNC: 32.5 GM/DL — SIGNIFICANT CHANGE UP (ref 32–36)
MCV RBC AUTO: 92.3 FL — SIGNIFICANT CHANGE UP (ref 80–100)
PLATELET # BLD AUTO: 362 K/UL — SIGNIFICANT CHANGE UP (ref 150–400)
POTASSIUM SERPL-MCNC: 5.1 MMOL/L — SIGNIFICANT CHANGE UP (ref 3.5–5.3)
POTASSIUM SERPL-SCNC: 5.1 MMOL/L — SIGNIFICANT CHANGE UP (ref 3.5–5.3)
RBC # BLD: 3.13 M/UL — LOW (ref 3.8–5.2)
RBC # FLD: 16.2 % — HIGH (ref 10.3–14.5)
SODIUM SERPL-SCNC: 140 MMOL/L — SIGNIFICANT CHANGE UP (ref 135–145)
WBC # BLD: 7.08 K/UL — SIGNIFICANT CHANGE UP (ref 3.8–10.5)
WBC # FLD AUTO: 7.08 K/UL — SIGNIFICANT CHANGE UP (ref 3.8–10.5)

## 2020-09-24 PROCEDURE — 99285 EMERGENCY DEPT VISIT HI MDM: CPT | Mod: 25

## 2020-09-24 PROCEDURE — 85610 PROTHROMBIN TIME: CPT

## 2020-09-24 PROCEDURE — 84100 ASSAY OF PHOSPHORUS: CPT

## 2020-09-24 PROCEDURE — 80053 COMPREHEN METABOLIC PANEL: CPT

## 2020-09-24 PROCEDURE — 83605 ASSAY OF LACTIC ACID: CPT

## 2020-09-24 PROCEDURE — 82272 OCCULT BLD FECES 1-3 TESTS: CPT

## 2020-09-24 PROCEDURE — 86922 COMPATIBILITY TEST ANTIGLOB: CPT

## 2020-09-24 PROCEDURE — 97163 PT EVAL HIGH COMPLEX 45 MIN: CPT

## 2020-09-24 PROCEDURE — P9016: CPT

## 2020-09-24 PROCEDURE — 36430 TRANSFUSION BLD/BLD COMPNT: CPT

## 2020-09-24 PROCEDURE — 96374 THER/PROPH/DIAG INJ IV PUSH: CPT

## 2020-09-24 PROCEDURE — 80048 BASIC METABOLIC PNL TOTAL CA: CPT

## 2020-09-24 PROCEDURE — 99232 SBSQ HOSP IP/OBS MODERATE 35: CPT

## 2020-09-24 PROCEDURE — 86901 BLOOD TYPING SEROLOGIC RH(D): CPT

## 2020-09-24 PROCEDURE — 86850 RBC ANTIBODY SCREEN: CPT

## 2020-09-24 PROCEDURE — 93005 ELECTROCARDIOGRAM TRACING: CPT

## 2020-09-24 PROCEDURE — 99238 HOSP IP/OBS DSCHRG MGMT 30/<: CPT

## 2020-09-24 PROCEDURE — 86900 BLOOD TYPING SEROLOGIC ABO: CPT

## 2020-09-24 PROCEDURE — U0003: CPT

## 2020-09-24 PROCEDURE — 83735 ASSAY OF MAGNESIUM: CPT

## 2020-09-24 PROCEDURE — 85025 COMPLETE CBC W/AUTO DIFF WBC: CPT

## 2020-09-24 PROCEDURE — 36415 COLL VENOUS BLD VENIPUNCTURE: CPT

## 2020-09-24 PROCEDURE — 85027 COMPLETE CBC AUTOMATED: CPT

## 2020-09-24 PROCEDURE — 85730 THROMBOPLASTIN TIME PARTIAL: CPT

## 2020-09-24 PROCEDURE — 86902 BLOOD TYPE ANTIGEN DONOR EA: CPT

## 2020-09-24 PROCEDURE — C1889: CPT

## 2020-09-24 RX ADMIN — CARVEDILOL PHOSPHATE 3.12 MILLIGRAM(S): 80 CAPSULE, EXTENDED RELEASE ORAL at 06:26

## 2020-09-24 RX ADMIN — Medication 81 MILLIGRAM(S): at 11:24

## 2020-09-24 RX ADMIN — PANTOPRAZOLE SODIUM 40 MILLIGRAM(S): 20 TABLET, DELAYED RELEASE ORAL at 06:26

## 2020-09-24 RX ADMIN — CHLORHEXIDINE GLUCONATE 1 APPLICATION(S): 213 SOLUTION TOPICAL at 06:27

## 2020-09-24 NOTE — DISCHARGE NOTE NURSING/CASE MANAGEMENT/SOCIAL WORK - PATIENT PORTAL LINK FT
You can access the FollowMyHealth Patient Portal offered by [facility] by registering at the following website: [email or web address]. By joining [facility] FollowMyHealth portal, you will also be able to view your health information using other applications (apps) compatible with our system.

## 2020-09-24 NOTE — PROGRESS NOTE ADULT - NUTRITIONAL ASSESSMENT
This patient has been assessed with a concern for Malnutrition and has been determined to have a diagnosis/diagnoses of Moderate protein-calorie malnutrition.    This patient is being managed with:   Diet Full Liquid-  Entered: Sep 22 2020  8:39AM    
This patient has been assessed with a concern for Malnutrition and has been determined to have a diagnosis/diagnoses of Moderate protein-calorie malnutrition.    This patient is being managed with:   Diet DASH/TLC-  Sodium & Cholesterol Restricted  Supplement Feeding Modality:  Oral  Ensure Enlive Cans or Servings Per Day:  3       Frequency:  Three Times a day  Entered: Sep 23 2020 10:49AM    

## 2020-09-24 NOTE — PROGRESS NOTE ADULT - PROVIDER SPECIALTY LIST ADULT
Cardiology
Critical Care
Critical Care
Gastroenterology
Gastroenterology
Hospitalist
Hospitalist
Gastroenterology

## 2020-09-24 NOTE — PROGRESS NOTE ADULT - REASON FOR ADMISSION
GI bleed, acute anemia

## 2020-09-24 NOTE — PROGRESS NOTE ADULT - ASSESSMENT
Duodenal ulcer with bleeding: Advance diet. Stop PPI infusion. PPI po bid. Prepare for discharge.
88F PMH CAD s/p CABG, MR, h/o GIB, HTN, HLD who presents with black stools, nausea, abdominal pain, f/w UGIB with visible ulcerative bleed in duodenum on EGD 9/21    Impression:  - UGIB - 2/2 visible ulcerative bleed in duodenum on EGD 9/21  - Melena  - CAD s/p CABG    Plan:  - Serial CBC  - Keep T&S active  - Maintain Hgb >7  - Monitor for s/s bleeding  - C/w protonix gtt  - Hold anti-hypertensive medications, no NSAIDs  - Monitor mental status  - GI input appreciated  - DVT PPx - SCDs  - C/w care in MICU given active GIB    Reggie Britt M.D.  Pulmonary & Critical Care Medicine  [facility]  [facility] [place]  [address]  [place] [address]  T: [phone]  F: [phone]
88F PMH CAD s/p CABG, MR, h/o GIB, HTN, HLD who presents with black stools, nausea, abdominal pain, f/w UGIB with visible ulcerative bleed in duodenum on EGD 9/21    Impression:  - UGIB - 2/2 visible ulcerative bleed in duodenum on EGD 9/21  - Melena  - CAD s/p CABG    Plan:  - Serial CBC  - Keep T&S active  - Maintain Hgb >7  - Monitor for s/s bleeding  - C/w protonix gtt x72h then transition to PO  - Hold anti-hypertensive medications, no NSAIDs  - Monitor mental status  - GI input appreciated  - DVT PPx - SCDs  - If appropriately responds to transfusion this AM, will downgrade from ICU    Reggie Britt M.D.  Pulmonary & Critical Care Medicine  [facility]  [facility] [place]  [address]  [place] [address]  T: [phone]  F: [phone]  
Pt is an 87 y/o female h/o CAD/prior CABG, prior GI bleed, Mitral regurg, HTN, HLD presents with " black" stools with associated upper abdominal pain that she describes as "hunger pain like cramps and nausea".    # Active GIB  S/p EGD 9/21 showing visible ulcer in duodenum, active bleeding, s/p epi injection and cauterization.  Changed PPI to PO  tolerating Full liq diet well. Cont same  GI appreciated    # acute anemia, normocytic secondary to GI blood loss - stable Hgb  s/p PRBC x 3  monitor H/H  hold aspirin for now, may resume    # CAD, HTN  most meds on hold sec to soft BP on arrival  now stable  will begin resuming them as tolerated    # CKD stg 2  avoid nephrotoxics  renally dose meds    ICDs    Plan- adv diet , PT evaluation  Disposition - likely home in next 24 hours
Pt is an 89 y/o female h/o CAD/prior CABG, prior GI bleed, Mitral regurg, HTN, HLD presents with " black" stools with associated upper abdominal pain that she describes as "hunger pain like cramps and nausea".    # Active GIB - S/p EGD 9/21 showing visible ulcer in duodenum, active bleeding, s/p epi injection and cauterization. Tolerating diet.  - Continue PPI  Discharge home    # acute anemia, normocytic secondary to GI blood loss - stable Hgb  s/p PRBC x 3    May resume ASA    # CAD, HTN  most meds on hold sec to soft BP on arrival  now stable  Resume ASA and home medications    # CKD stg 2  avoid nephrotoxics  renally dose meds    ICDs    Disposition - Discharge home
UGI bleeding has stopped clinically.  S/P Cautery of Bleeding DU.  Tolerates FLD.  Would continue same for another day.  Same PPI infusion for another day.  Monitor Hgb with 1 additional unit today.  Stable from GI POV.

## 2020-09-24 NOTE — PROGRESS NOTE ADULT - SUBJECTIVE AND OBJECTIVE BOX
HOSPITALIST PROGRESS NOTE    MICHAEL FOSTER  [id number]  88yFemale    Patient is a 88y old  Female who presents with a chief complaint of GI bleed, acute anemia (24 Sep 2020 08:55)      SUBJECTIVE:   Chart reviewed since last visit.  Patient seen and examined at bedside for GIB, ABLA  Denies any nausea, vomiting, abdominal pain or diarrhea.  No BM since 2 days  Denies any dizziness, dyspnea, chest pain, palpitations    OBJECTIVE:  Vital Signs Last 24 Hrs  T(C): 36.7 (24 Sep 2020 04:36), Max: 37 (23 Sep 2020 15:20)  T(F): 98.1 (24 Sep 2020 04:36), Max: 98.6 (23 Sep 2020 15:20)  HR: 77 (24 Sep 2020 06:29) (65 - 77)  BP: 111/69 (24 Sep 2020 06:29) (93/56 - 128/60)  BP(mean): 86 (23 Sep 2020 13:48) (86 - 86)  RR: 18 (24 Sep 2020 04:36) (18 - 20)  SpO2: 98% (24 Sep 2020 04:36) (95% - 98%)    PHYSICAL EXAMINATION  General: Sitting in chair comfortable  HEENT:  pale conjunctiva  NECK:  supple  CVS: regular rate and rhythm S1 S2  RESP:  CTAB  GI:  Soft nondistended nontender BS+  : No suprapubic tenderness  MSK:  FROM - no edema  CNS:  NO gross focal or global deficit noted  INTEG:  warm dry skin  PSYCH:  Fair mood    MONITOR:  CAPILLARY BLOOD GLUCOSE            I&O's Summary    23 Sep 2020 07:01  -  24 Sep 2020 07:00  --------------------------------------------------------  IN: 300 mL / OUT: 0 mL / NET: 300 mL                            9.4    7.08  )-----------( 362      ( 24 Sep 2020 07:16 )             28.9       09-24    140  |  104  |  23.0<H>  ----------------------------<  112<H>  5.1   |  27.0  |  0.87    Ca    9.0      24 Sep 2020 07:16              Culture:    TTE:    RADIOLOGY        MEDICATIONS  (STANDING):  aspirin  chewable 81 milliGRAM(s) Oral daily  carvedilol 3.125 milliGRAM(s) Oral every 12 hours  chlorhexidine 4% Liquid 1 Application(s) Topical <User Schedule>  melatonin 5 milliGRAM(s) Oral at bedtime  melatonin 3 milliGRAM(s) Oral at bedtime  pantoprazole    Tablet 40 milliGRAM(s) Oral two times a day  simvastatin 20 milliGRAM(s) Oral at bedtime      MEDICATIONS  (PRN):  
  HOSPITALIST PROGRESS NOTE    MICHAEL FOSTER  [id number]  88yFemale    Patient is a 88y old  Female who presents with a chief complaint of GI bleed, acute anemia (23 Sep 2020 12:21)      SUBJECTIVE:   Chart reviewed since last visit.  Patient seen and examined at bedside for Duodenal ulcer and acute blood loss anemia    Denies any dizziness, chest pain, palpitations, dyspnea   Denies any nausea, vomiting or abdominal pain  Awaiting BM      OBJECTIVE:  Vital Signs Last 24 Hrs  T(C): 36.6 (23 Sep 2020 13:48), Max: 37.1 (23 Sep 2020 00:39)  T(F): 97.8 (23 Sep 2020 13:48), Max: 98.8 (23 Sep 2020 00:39)  HR: 66 (23 Sep 2020 13:48) (56 - 70)  BP: 120/69 (23 Sep 2020 13:48) (104/53 - 122/58)  BP(mean): 86 (23 Sep 2020 13:48) (76 - 86)  RR: 20 (23 Sep 2020 13:48) (16 - 23)  SpO2: 95% (23 Sep 2020 13:48) (79% - 98%)    PHYSICAL EXAMINATION  General: Sitting in chair comfortable  HEENT:  pale conjunctiva  NECK:  supple  CVS: regular rate and rhythm S1 S2  RESP:  CTAB  GI:  Soft nondistended nontender BS+  : No suprapubic tenderness  MSK:  FROM - no edema  CNS:  NO gross focal or global deficit noted  INTEG:  warm dry skin  PSYCH:  Fair mood    MONITOR:  CAPILLARY BLOOD GLUCOSE            I&O's Summary    22 Sep 2020 07:01  -  23 Sep 2020 07:00  --------------------------------------------------------  IN: 300 mL / OUT: 0 mL / NET: 300 mL                            9.6    7.80  )-----------( 336      ( 23 Sep 2020 05:44 )             29.0       09-23    141  |  107  |  20.0  ----------------------------<  120<H>  4.3   |  24.0  |  0.80    Ca    8.7      23 Sep 2020 05:44  Phos  2.7     09-22  Mg     2.2     09-22              Culture:    TTE:    RADIOLOGY        MEDICATIONS  (STANDING):  aspirin  chewable 81 milliGRAM(s) Oral daily  carvedilol 3.125 milliGRAM(s) Oral every 12 hours  chlorhexidine 4% Liquid 1 Application(s) Topical <User Schedule>  melatonin 5 milliGRAM(s) Oral at bedtime  melatonin 3 milliGRAM(s) Oral at bedtime  pantoprazole    Tablet 40 milliGRAM(s) Oral two times a day  simvastatin 20 milliGRAM(s) Oral at bedtime      MEDICATIONS  (PRN):  
[facility]                                          [address], [address], [place] [address]                                               PHONE: [phone]    FAX: [phone] [address], [address], [place] [address]                                       PHONE: [phone]    FAX: [phone]  *******************************************************************************    Overnight events/Subjective Assessment: no CP/palp/SOB.  Pt denies any further bleeding.  She is anxiously awaiting EGD today.    INTERPRETATION OF TELEMETRY (personally reviewed): SR 60-80s, no events    No Known Allergies    MEDICATIONS  (STANDING):  chlorhexidine 4% Liquid 1 Application(s) Topical <User Schedule>  pantoprazole Infusion 8 mG/Hr (10 mL/Hr) IV Continuous <Continuous>    MEDICATIONS  (PRN):      Vital Signs Last 24 Hrs  T(C): 36.6 (21 Sep 2020 07:31), Max: 37.1 (21 Sep 2020 00:20)  T(F): 97.8 (21 Sep 2020 07:31), Max: 98.8 (21 Sep 2020 00:20)  HR: 62 (21 Sep 2020 07:00) (62 - 102)  BP: 99/55 (21 Sep 2020 07:00) (85/55 - 144/63)  BP(mean): 74 (21 Sep 2020 07:00) (72 - 91)  RR: 15 (21 Sep 2020 07:00) (15 - 26)  SpO2: 96% (21 Sep 2020 07:00) (94% - 100%)    I&O's Detail    20 Sep 2020 07:01  -  21 Sep 2020 07:00  --------------------------------------------------------  IN:    Oral Fluid: 360 mL    Pantoprazole: 110 mL  Total IN: 470 mL    OUT:  Total OUT: 0 mL    Total NET: 470 mL        I&O's Summary    20 Sep 2020 07:01  -  21 Sep 2020 07:00  --------------------------------------------------------  IN: 470 mL / OUT: 0 mL / NET: 470 mL            PHYSICAL EXAM:  General: Appears well developed, well nourished, no acute distress  HEENT: Head: normocephalic, atraumatic  Eyes: Pupils equal and reactive  Neck: Supple, no carotid bruit, no JVD, no HJR  CARDIOVASCULAR: Normal S1 and S2, 2/6 HSM apex  LUNGS: Clear to auscultation bilaterally, no rales, rhonchi or wheeze  ABDOMEN: Soft, nontender, non-distended, positive bowel sounds, no mass or bruit  EXTREMITIES: No edema, distal pulses WNL  SKIN: Warm and dry with normal turgor  NEURO: Alert & oriented x 3, grossly intact  PSYCH: normal mood and affect        LABS:                        8.1    10.54 )-----------( 277      ( 21 Sep 2020 05:04 )             23.6     09-21    139  |  106  |  35.0<H>  ----------------------------<  118<H>  4.0   |  24.0  |  0.77    Ca    8.2<L>      21 Sep 2020 05:04    TPro  5.7<L>  /  Alb  3.5  /  TBili  <0.2<L>  /  DBili  x   /  AST  20  /  ALT  17  /  AlkPhos  49  09-20        PT/INR - ( 20 Sep 2020 10:08 )   PT: 12.4 sec;   INR: 1.07 ratio         PTT - ( 20 Sep 2020 10:08 )  PTT:26.6 sec  serum  Lipids:         ASSESSMENT AND PLAN:  In summary, MICHAEL FOSTER is a 88y Female with HTN, HLD, CAD s/p CABG 2011 a/w abdominal pain and acute blood loss anemia with melena 2/2 UGIB.  Hb 6.4 from > 12 recently.    - Pt appears hemodynamically stable  - Monitor CBC, transfuse to keep Hb > 8  - No cardiac contraindications to urgent EGD  - ASA on hold for now, but needs to be restarted as soon as possible and safe from GI standpoint.  She denies any history of stents.  - Would resume all home CV medications when possible  - Will follow as needed, please call with any CV questions or concerns      Reggie Bowen MD
Patient is a 88y old  Female who presents with a chief complaint of GI bleed, acute anemia (21 Sep 2020 08:02)      BRIEF HOSPITAL COURSE:   88F PMH CAD s/p CABG, MR, h/o GIB, HTN, HLD who presents with black stools, nausea, abdominal pain. Denies hematemesis or hematochezia. Reportedly was in the ED 6 days PTA for nausea, abdominal pain, and CT of abdomen/pelvis showing duodenitis with alrge hiatal hernia. Prior GIB admission had EGD showing clean based duodenal ulcer.     Admitted to MICU given acute drop in Hgb.     Events last 24 hours: Had EGD at bedside on 9/21 showing visible ulcer in duodenum, active bleeding, s/p epi injection and cauterization.    PAST MEDICAL & SURGICAL HISTORY:  Drop foot gait  left foot    Disc disorder of lumbar region    Hernia  hiatal, gastric hernias    Hernia, ventral    Rotator cuff (capsule) sprain    HLD (hyperlipidemia)    HTN (hypertension)    CAD (coronary artery disease)    Arthritis    S/P discectomy  lumbar- 1996    Gastric disorder  gastric hernia repair- 3/16/12    S/P CABG x 3  8/18/11    S/P arthroscopy of shoulder  right rotator cuff repair- 2006    S/P arthroscopy of shoulder  2001- left rotator cuff repair    S/P hysterectomy  1972          Medications:              pantoprazole Infusion 8 mG/Hr IV Continuous <Continuous>            chlorhexidine 4% Liquid 1 Application(s) Topical <User Schedule>            ICU Vital Signs Last 24 Hrs  T(C): 36.3 (21 Sep 2020 12:01), Max: 37.1 (21 Sep 2020 00:20)  T(F): 97.3 (21 Sep 2020 12:01), Max: 98.8 (21 Sep 2020 00:20)  HR: 68 (21 Sep 2020 10:00) (62 - 86)  BP: 128/60 (21 Sep 2020 10:00) (95/70 - 148/63)  BP(mean): 86 (21 Sep 2020 10:00) (72 - 91)  ABP: --  ABP(mean): --  RR: 17 (21 Sep 2020 10:00) (15 - 26)  SpO2: 98% (21 Sep 2020 10:00) (94% - 100%)          I&O's Detail    20 Sep 2020 07:01  -  21 Sep 2020 07:00  --------------------------------------------------------  IN:    Oral Fluid: 360 mL    Pantoprazole: 110 mL  Total IN: 470 mL    OUT:  Total OUT: 0 mL    Total NET: 470 mL            LABS:                        8.1    10.54 )-----------( 277      ( 21 Sep 2020 05:04 )             23.6     09-21    139  |  106  |  35.0<H>  ----------------------------<  118<H>  4.0   |  24.0  |  0.77    Ca    8.2<L>      21 Sep 2020 05:04    TPro  5.7<L>  /  Alb  3.5  /  TBili  <0.2<L>  /  DBili  x   /  AST  20  /  ALT  17  /  AlkPhos  49  09-20          CAPILLARY BLOOD GLUCOSE        PT/INR - ( 20 Sep 2020 10:08 )   PT: 12.4 sec;   INR: 1.07 ratio         PTT - ( 20 Sep 2020 10:08 )  PTT:26.6 sec    CULTURES:  Culture Results:   >100,000 CFU/ml Escherichia coli (09-14 @ 22:03)      Physical Examination:  GENERAL: In NAD   HEENT: NC/AT  NECK: Supple, trachea midline  PULM: CTA anteriorly, good inspiratory effort, non-labored breathing  CVS: +S1, S2, RRR  ABD: Soft, NT/ND  EXT: No pedal edema  SKIN: Pale  NEURO: Alert, oriented and interactive, non-focal    DEVICES:     RADIOLOGY:   < from: Xray Chest 1 View- PORTABLE-Urgent (Xray Chest 1 View- PORTABLE-Urgent .) (09.19.19 @ 11:40) >  IMPRESSION: Right jugular line inserted. Otherwise stable findings   without acute finding.    < end of copied text >    
Patient is a 88y old  Female who presents with a chief complaint of GI bleed, acute anemia (21 Sep 2020 12:53)      BRIEF HOSPITAL COURSE:   88F PMH CAD s/p CABG, MR, h/o GIB, HTN, HLD who presents with black stools, nausea, abdominal pain. Denies hematemesis or hematochezia. Reportedly was in the ED 6 days PTA for nausea, abdominal pain, and CT of abdomen/pelvis showing duodenitis with alrge hiatal hernia. Prior GIB admission had EGD showing clean based duodenal ulcer.     Admitted to MICU given acute drop in Hgb.     S/p EGD 9/21 showing visible ulcer in duodenum, active bleeding, s/p epi injection and cauterization.    Events last 24 hours: Drop in Hgb noted, being given 1u pRBC.    PAST MEDICAL & SURGICAL HISTORY:  Drop foot gait  left foot    Disc disorder of lumbar region    Hernia  hiatal, gastric hernias    Hernia, ventral    Rotator cuff (capsule) sprain    HLD (hyperlipidemia)    HTN (hypertension)    CAD (coronary artery disease)    Arthritis    S/P discectomy  lumbar- 1996    Gastric disorder  gastric hernia repair- 3/16/12    S/P CABG x 3  8/18/11    S/P arthroscopy of shoulder  right rotator cuff repair- 2006    S/P arthroscopy of shoulder  2001- left rotator cuff repair    S/P hysterectomy  1972          Medications:        melatonin 5 milliGRAM(s) Oral at bedtime        pantoprazole Infusion 8 mG/Hr IV Continuous <Continuous>      simvastatin 20 milliGRAM(s) Oral at bedtime        chlorhexidine 4% Liquid 1 Application(s) Topical <User Schedule>            ICU Vital Signs Last 24 Hrs  T(C): 36.9 (22 Sep 2020 07:50), Max: 37.1 (21 Sep 2020 23:40)  T(F): 98.4 (22 Sep 2020 07:50), Max: 98.7 (21 Sep 2020 23:40)  HR: 68 (22 Sep 2020 08:00) (62 - 93)  BP: 117/53 (22 Sep 2020 08:00) (86/45 - 165/81)  BP(mean): 76 (22 Sep 2020 08:00) (61 - 112)  ABP: --  ABP(mean): --  RR: 18 (22 Sep 2020 08:00) (17 - 20)  SpO2: 99% (22 Sep 2020 08:00) (96% - 100%)          I&O's Detail    21 Sep 2020 07:01  -  22 Sep 2020 07:00  --------------------------------------------------------  IN:    Oral Fluid: 720 mL    Pantoprazole: 240 mL  Total IN: 960 mL    OUT:    Voided (mL): 1300 mL  Total OUT: 1300 mL    Total NET: -340 mL            LABS:                        7.6    10.44 )-----------( 323      ( 22 Sep 2020 05:21 )             22.3     09-22    138  |  107  |  19.0  ----------------------------<  110<H>  3.9   |  24.0  |  0.79    Ca    8.6      22 Sep 2020 05:21  Phos  2.7     09-22  Mg     2.2     09-22            CAPILLARY BLOOD GLUCOSE            CULTURES:      Physical Examination:  GENERAL: In NAD   HEENT: NC/AT  NECK: Supple, trachea midline  PULM: CTA anteriorly, good inspiratory effort, non-labored breathing  CVS: +S1, S2, RRR  ABD: Soft, NT/ND  EXT: No pedal edema  SKIN: Pale  NEURO: Alert, oriented and interactive, non-focal     DEVICES:     RADIOLOGY:     < from: Xray Chest 1 View- PORTABLE-Urgent (Xray Chest 1 View- PORTABLE-Urgent .) (09.19.19 @ 11:40) >  INTERPRETATION:  AP chest on September 19, 2019 at 11:30 AM. Patient had   jugular line placement.    Patient is short of breath.    Poor inspiration crowds the chest. Heart is magnified by technique.    Sternotomy, moderate hiatal hernia, and reversed left shoulder   replacement again seen.    Lungs remain unremarkable.    The above findings are similar to study earlier in the day.    Present filmshows a right jugular line with the tip in the right atrial   region.    IMPRESSION: Right jugular line inserted. Otherwise stable findings   without acute finding.    < end of copied text >  
Patient is a 88y old  Female who presents with a chief complaint of GI bleed, acute anemia (23 Sep 2020 14:49)      HPI:  Pt is an 87 y/o female h/o CAD/prior CABG, prior GI bleed, Mitral regurg, HTN, HLD presents with " black" stools. Found to have actively bleeding DU requiring epinephrine and cautery. Hgb stable. NO pain. Tolerating solid diet.     REVIEW OF SYSTEMS:  Constitutional: No fever, weight loss or fatigue  ENMT:  No difficulty hearing, tinnitus, vertigo; No sinus or throat pain  Respiratory: No cough, wheezing, chills or hemoptysis  Cardiovascular: No chest pain, palpitations, dizziness or leg swelling  Gastrointestinal: No abdominal or epigastric pain. No nausea, vomiting or hematemesis; No diarrhea or constipation. No melena or hematochezia.  Skin: No itching, burning, rashes or lesions   Musculoskeletal: No joint pain or swelling; No muscle, back or extremity pain    PAST MEDICAL & SURGICAL HISTORY:  Drop foot gait  left foot    Disc disorder of lumbar region    Hernia  hiatal, gastric hernias    Hernia, ventral    Rotator cuff (capsule) sprain    HLD (hyperlipidemia)    HTN (hypertension)    CAD (coronary artery disease)    Arthritis    S/P discectomy  lumbar-     Gastric disorder  gastric hernia repair- 3/16/12    S/P CABG x 3  11    S/P arthroscopy of shoulder  right rotator cuff repair-     S/P arthroscopy of shoulder  2001- left rotator cuff repair    S/P hysterectomy  1972        FAMILY HISTORY:  Family history of lung cancer  son- small cell carcinoma  at 43 y/o    Family history of Hodgkin&#x27;s lymphoma (Child)  son  at 44.    Family history of Alzheimer&#x27;s disease  mother  at 93 complications of alzheimers    Family history of coronary artery disease  father  at 52 from MI        SOCIAL HISTORY:  Smoking Status: [ ] Current, [ ] Former, [ ] Never  Pack Years:  [  ] EtOH-no  [  ] IVDA    MEDICATIONS:  MEDICATIONS  (STANDING):  aspirin  chewable 81 milliGRAM(s) Oral daily  carvedilol 3.125 milliGRAM(s) Oral every 12 hours  chlorhexidine 4% Liquid 1 Application(s) Topical <User Schedule>  melatonin 5 milliGRAM(s) Oral at bedtime  melatonin 3 milliGRAM(s) Oral at bedtime  pantoprazole    Tablet 40 milliGRAM(s) Oral two times a day  simvastatin 20 milliGRAM(s) Oral at bedtime    MEDICATIONS  (PRN):      Allergies    No Known Allergies    Intolerances        Vital Signs Last 24 Hrs  T(C): 36.7 (24 Sep 2020 04:36), Max: 37 (23 Sep 2020 15:20)  T(F): 98.1 (24 Sep 2020 04:36), Max: 98.6 (23 Sep 2020 15:20)  HR: 77 (24 Sep 2020 06:29) (65 - 77)  BP: 111/69 (24 Sep 2020 06:29) (93/56 - 128/60)  BP(mean): 86 (23 Sep 2020 13:48) (86 - 86)  RR: 18 (24 Sep 2020 04:36) (18 - 20)  SpO2: 98% (24 Sep 2020 04:36) (95% - 98%)     @ 07:01  -   @ 07:00  --------------------------------------------------------  IN: 300 mL / OUT: 0 mL / NET: 300 mL          PHYSICAL EXAM:    General: Well developed; well nourished; in no acute distress  HEENT: MMM, conjunctiva and sclera clear  H- RRR  L- CTA  Gastrointestinal: Soft, non-tender non-distended; Normal bowel sounds; No rebound or guarding  Extremities: Normal range of motion, No clubbing, cyanosis or edema  Neurological: Alert and oriented x3  Skin: Warm and dry. No obvious rash      LABS:                        9.4    7.08  )-----------( 362      ( 24 Sep 2020 07:16 )             28.9     24 Sep 2020 07:16    140    |  104    |  23.0   ----------------------------<  112    5.1     |  27.0   |  0.87     Ca    9.0        24 Sep 2020 07:16                RADIOLOGY & ADDITIONAL STUDIES:     < from: CT Abdomen and Pelvis w/ IV Cont (20 @ 10:37) >  MPRESSION:    Patchy diminished cortical enhancement of the right kidney concerning for acute pyelonephritis. Suggest clinical correlation.    Mural thickening third portion of the duodenum.    Large hiatal hernia.    Additional findings as above    < end of copied text >  
Patient seen and examined; chart reviewed.  Events noted.  EGD yesterday c cautery of bleeding DU.  Bleeding arrested.  No clinical bleeding.  Had 1 small black BM overnight.  No vomiting.  Had biious vomiting post procedure x 2 hrs, resolved with zofran.  Currently feels well.  Denies N/V,Anorexia.  Had tolerated CLD and FLD, yesterday and today.  No distention.  Still on PPI drip.  Off of pressors.    Hgb drifted down with iv hydration.  BUN has normalized.  Thus far transfused 2 units and HGB 9 to 7.6.  Due for 1 additional unit of blood.      PAST MEDICAL & SURGICAL HISTORY:  Drop foot gait  left foot    Disc disorder of lumbar region    Hernia  hiatal, gastric hernias    Hernia, ventral    Rotator cuff (capsule) sprain    HLD (hyperlipidemia)    HTN (hypertension)    CAD (coronary artery disease)    Arthritis    S/P discectomy  lumbar- 1996    Gastric disorder  gastric hernia repair- 3/16/12    S/P CABG x 3  8/18/11    S/P arthroscopy of shoulder  right rotator cuff repair- 2006    S/P arthroscopy of shoulder  2001- left rotator cuff repair    S/P hysterectomy  1972        ROS:  No Heartburn, regurgitation, dysphagia, odynophagia.  No dyspepsia  No abdominal pain.    No Nausea, vomiting.  No Bleeding.  No hematemesis.   No diarrhea.    No hematochesia.  No weight loss, anorexia.  No edema.      MEDICATIONS  (STANDING):  chlorhexidine 4% Liquid 1 Application(s) Topical <User Schedule>  melatonin 5 milliGRAM(s) Oral at bedtime  pantoprazole Infusion 8 mG/Hr (10 mL/Hr) IV Continuous <Continuous>  simvastatin 20 milliGRAM(s) Oral at bedtime    MEDICATIONS  (PRN):      Allergies    No Known Allergies    Intolerances        Vital Signs Last 24 Hrs  T(C): 36.6 (22 Sep 2020 12:49), Max: 37.1 (21 Sep 2020 23:40)  T(F): 97.8 (22 Sep 2020 12:49), Max: 98.7 (21 Sep 2020 23:40)  HR: 78 (22 Sep 2020 12:00) (62 - 92)  BP: 108/81 (22 Sep 2020 12:00) (86/45 - 152/68)  BP(mean): 88 (22 Sep 2020 12:00) (61 - 98)  RR: 15 (22 Sep 2020 12:00) (15 - 20)  SpO2: 100% (22 Sep 2020 12:00) (78% - 100%)    PHYSICAL EXAM:    GENERAL: NAD, well-groomed, well-developed  HEAD:  Atraumatic, Normocephalic  EYES: EOMI, PERRLA, conjunctiva and sclera clear  ENMT: No tonsillar erythema, exudates, or enlargement; Moist mucous membranes, Good dentition, No lesions  NECK: Supple, No JVD, Normal thyroid  CHEST/LUNG: Clear to percussion bilaterally; No rales, rhonchi, wheezing, or rubs  HEART: Regular rate and rhythm; No murmurs, rubs, or gallops  ABDOMEN: Soft, Nontender, Nondistended; Bowel sounds present.  No HSM.  No MTR.    EXTREMITIES:  2+ Peripheral Pulses, No clubbing, cyanosis, or edema  LYMPH: No lymphadenopathy noted  SKIN: No rashes or lesions      LABS:                        7.6    10.44 )-----------( 323      ( 22 Sep 2020 05:21 )             22.3     09-22    138  |  107  |  19.0  ----------------------------<  110<H>  3.9   |  24.0  |  0.79    Ca    8.6      22 Sep 2020 05:21  Phos  2.7     09-22  Mg     2.2     09-22               RADIOLOGY & ADDITIONAL STUDIES:
INTERVAL HPI/OVERNIGHT EVENTS:FU for GI bleeding. No more bleeding.     MEDICATIONS  (STANDING):  carvedilol 3.125 milliGRAM(s) Oral every 12 hours  chlorhexidine 4% Liquid 1 Application(s) Topical <User Schedule>  melatonin 5 milliGRAM(s) Oral at bedtime  melatonin 3 milliGRAM(s) Oral at bedtime  pantoprazole Infusion 8 mG/Hr (10 mL/Hr) IV Continuous <Continuous>  simvastatin 20 milliGRAM(s) Oral at bedtime    MEDICATIONS  (PRN):      Allergies    No Known Allergies    Intolerances        Vital Signs Last 24 Hrs  T(C): 36.5 (23 Sep 2020 05:08), Max: 37.1 (23 Sep 2020 00:39)  T(F): 97.7 (23 Sep 2020 05:08), Max: 98.8 (23 Sep 2020 00:39)  HR: 60 (23 Sep 2020 05:08) (56 - 78)  BP: 112/66 (23 Sep 2020 05:08) (104/53 - 131/60)  BP(mean): 83 (23 Sep 2020 00:00) (76 - 88)  RR: 18 (23 Sep 2020 05:08) (15 - 23)  SpO2: 98% (23 Sep 2020 05:08) (79% - 100%)    LABS:                        9.6    7.80  )-----------( 336      ( 23 Sep 2020 05:44 )             29.0     09-23    141  |  107  |  20.0  ----------------------------<  120<H>  4.3   |  24.0  |  0.80    Ca    8.7      23 Sep 2020 05:44  Phos  2.7     09-22  Mg     2.2     09-22            RADIOLOGY & ADDITIONAL TESTS:

## 2020-10-21 RX ORDER — PANTOPRAZOLE 40 MG/1
40 TABLET, DELAYED RELEASE ORAL DAILY
Qty: 90 | Refills: 0 | Status: ACTIVE | COMMUNITY
Start: 2019-11-20 | End: 1900-01-01

## 2020-10-22 ENCOUNTER — APPOINTMENT (OUTPATIENT)
Dept: GASTROENTEROLOGY | Facility: CLINIC | Age: 85
End: 2020-10-22
Payer: MEDICARE

## 2020-10-22 VITALS
DIASTOLIC BLOOD PRESSURE: 70 MMHG | OXYGEN SATURATION: 99 % | WEIGHT: 126 LBS | BODY MASS INDEX: 26.45 KG/M2 | HEIGHT: 58 IN | RESPIRATION RATE: 14 BRPM | HEART RATE: 61 BPM | SYSTOLIC BLOOD PRESSURE: 117 MMHG

## 2020-10-22 PROCEDURE — 99214 OFFICE O/P EST MOD 30 MIN: CPT | Mod: 25

## 2020-10-22 NOTE — PHYSICAL EXAM
[General Appearance - Alert] : alert [General Appearance - In No Acute Distress] : in no acute distress [General Appearance - Well Nourished] : well nourished [General Appearance - Well Developed] : well developed [General Appearance - Well-Appearing] : healthy appearing [Sclera] : the sclera and conjunctiva were normal [PERRL With Normal Accommodation] : pupils were equal in size, round, and reactive to light [Extraocular Movements] : extraocular movements were intact [Outer Ear] : the ears and nose were normal in appearance [Hearing Threshold Finger Rub Not [place]] : hearing was normal [Examination Of The Oral Cavity] : the lips and gums were normal [Oropharynx] : the oropharynx was normal [Neck Appearance] : the appearance of the neck was normal [Auscultation Breath Sounds / Voice Sounds] : lungs were clear to auscultation bilaterally [Heart Rate And Rhythm] : heart rate was normal and rhythm regular [Heart Sounds] : normal S1 and S2 [Heart Sounds Gallop] : no gallops [Murmurs] : no murmurs [Heart Sounds Pericardial Friction Rub] : no pericardial rub [Bowel Sounds] : normal bowel sounds [Abdomen Soft] : soft [Abdomen Tenderness] : non-tender [Abdomen Mass (___ Cm)] : no abdominal mass palpated [Abnormal Walk] : normal gait [Nail Clubbing] : no clubbing  or cyanosis of the fingernails [Musculoskeletal - Swelling] : no joint swelling seen [Motor Tone] : muscle strength and tone were normal [Skin Color & Pigmentation] : normal skin color and pigmentation [Skin Turgor] : normal skin turgor [] : no rash [Motor Exam] : the motor exam was normal [No Focal Deficits] : no focal deficits [Oriented To Time, Place, And Person] : oriented to person, place, and time [Impaired Insight] : insight and judgment were intact [Affect] : the affect was normal

## 2020-10-22 NOTE — HISTORY OF PRESENT ILLNESS
[de-identified] : The patient had continued to take pantoprazole 40 mg p.o. q.a.m. due to her prior history of a bleeding duodenal ulcer and large hiatal hernia and had been doing well until late September of this year when she was admitted to [facility] with epigastric pain that was described as a hunger like sensation as well as melena. A CAT scan of the abdomen and pelvis showed what looked like duodenitis in the third portion of the duodenum and a large hiatal hernia. She underwent an endoscopy that revealed a small 6 mm ulcer in the duodenal sweep which was bleeding and for which a Hemoclip was applied. She required the transfusion of 3 units of packed red blood cells. She was discharged on pantoprazole 40 mg p.o. b.i.d. blood in one of her discharge papers it was stated that the medication should be discontinued. Therefore one week ago the medication was discontinued all together. Nevertheless she continues to be free of any further melena or upper abdominal pain. She has developed some constipation with occasional lower abdominal cramping. There is no nausea or vomiting. Her appetite and weight are stable.

## 2020-10-22 NOTE — ASSESSMENT
[FreeTextEntry1] : At this time I recommended that she restart pantoprazole 40 mg p.o. b.i.d. as on once daily she developed a bleeding ulcer once again. She'll obtain a CBC, basic metabolic profile, prothrombin time and iron studies within the next one to 2 weeks. She will continue on twice-daily pantoprazole until she is seen next in February of next year. At that time if she continues to do well I will switch her to pantoprazole 40 mg p.o. q.a.m. and famotidine 40 mg every evening. She will call for the results of the blood work one week after the blood test is obtained.

## 2020-10-23 LAB
ANION GAP SERPL CALC-SCNC: 13 MMOL/L
BASOPHILS # BLD AUTO: 0.08 K/UL
BASOPHILS NFR BLD AUTO: 1.1 %
BUN SERPL-MCNC: 40 MG/DL
CALCIUM SERPL-MCNC: 10.3 MG/DL
CHLORIDE SERPL-SCNC: 99 MMOL/L
CO2 SERPL-SCNC: 28 MMOL/L
CREAT SERPL-MCNC: 0.98 MG/DL
EOSINOPHIL # BLD AUTO: 0.2 K/UL
EOSINOPHIL NFR BLD AUTO: 2.8 %
FERRITIN SERPL-MCNC: 20 NG/ML
GLUCOSE SERPL-MCNC: 102 MG/DL
HCT VFR BLD CALC: 37.9 %
HGB BLD-MCNC: 11.6 G/DL
IMM GRANULOCYTES NFR BLD AUTO: 0.1 %
INR PPP: 0.91 RATIO
IRON SATN MFR SERPL: 8 %
IRON SERPL-MCNC: 34 UG/DL
LYMPHOCYTES # BLD AUTO: 2.13 K/UL
LYMPHOCYTES NFR BLD AUTO: 30 %
MAN DIFF?: NORMAL
MCHC RBC-ENTMCNC: 28.4 PG
MCHC RBC-ENTMCNC: 30.6 GM/DL
MCV RBC AUTO: 92.9 FL
MONOCYTES # BLD AUTO: 0.56 K/UL
MONOCYTES NFR BLD AUTO: 7.9 %
NEUTROPHILS # BLD AUTO: 4.13 K/UL
NEUTROPHILS NFR BLD AUTO: 58.1 %
PLATELET # BLD AUTO: 310 K/UL
POTASSIUM SERPL-SCNC: 5 MMOL/L
PT BLD: 10.8 SEC
RBC # BLD: 4.08 M/UL
RBC # FLD: 13.7 %
SODIUM SERPL-SCNC: 140 MMOL/L
TIBC SERPL-MCNC: 439 UG/DL
UIBC SERPL-MCNC: 405 UG/DL
WBC # FLD AUTO: 7.11 K/UL

## 2020-11-03 RX ORDER — OMEGA-3/DHA/EPA/FISH OIL 300-1000MG
45 CAPSULE ORAL DAILY
Qty: 30 | Refills: 2 | Status: ACTIVE | COMMUNITY
Start: 2020-11-03 | End: 1900-01-01

## 2021-02-05 ENCOUNTER — APPOINTMENT (OUTPATIENT)
Dept: GASTROENTEROLOGY | Facility: CLINIC | Age: 86
End: 2021-02-05
Payer: MEDICARE

## 2021-02-05 VITALS
DIASTOLIC BLOOD PRESSURE: 71 MMHG | SYSTOLIC BLOOD PRESSURE: 118 MMHG | WEIGHT: 128 LBS | HEIGHT: 57 IN | HEART RATE: 69 BPM | OXYGEN SATURATION: 99 % | BODY MASS INDEX: 27.61 KG/M2 | TEMPERATURE: 97.7 F | RESPIRATION RATE: 14 BRPM

## 2021-02-05 DIAGNOSIS — K44.9 DIAPHRAGMATIC HERNIA W/OUT OBSTRUCTION OR GANGRENE: ICD-10-CM

## 2021-02-05 DIAGNOSIS — K59.09 OTHER CONSTIPATION: ICD-10-CM

## 2021-02-05 DIAGNOSIS — K26.4 CHRONIC OR UNSPECIFIED DUODENAL ULCER WITH HEMORRHAGE: ICD-10-CM

## 2021-02-05 DIAGNOSIS — Z87.19 PERSONAL HISTORY OF OTHER DISEASES OF THE DIGESTIVE SYSTEM: ICD-10-CM

## 2021-02-05 PROCEDURE — 99214 OFFICE O/P EST MOD 30 MIN: CPT

## 2021-02-05 RX ORDER — FAMOTIDINE 40 MG/1
40 TABLET, FILM COATED ORAL
Qty: 30 | Refills: 5 | Status: ACTIVE | COMMUNITY
Start: 2021-02-05 | End: 1900-01-01

## 2021-02-05 NOTE — HISTORY OF PRESENT ILLNESS
[de-identified] : The patient had continued to take pantoprazole 40 mg p.o. q.a.m. due to her prior history of a bleeding duodenal ulcer and large hiatal hernia and had been doing well until late September of 2020 when she was admitted to [facility] with epigastric pain that was described as a hunger like sensation as well as melena. A CAT scan of the abdomen and pelvis showed what looked like duodenitis in the third portion of the duodenum and a large hiatal hernia. She underwent an endoscopy that revealed a small 6 mm ulcer in the duodenal sweep which was bleeding and for which a Hemoclip was applied. She required the transfusion of 3 units of packed red blood cells. She was discharged on pantoprazole 40 mg p.o. b.i.d. blood in one of her discharge papers it was stated that the medication should be discontinued. Therefore one week prior to her last visit with me  the medication was discontinued all together. Nevertheless she continued to be free of any further melena or upper abdominal pain. She had developed some constipation with occasional lower abdominal cramping. There is no nausea or vomiting. Her appetite and weight were stable.  At that time, October 2020, she was advised to restart pantoprazole at a dose of 40 mg p.o. twice daily.  I also recommended that she take Benefiber or Metamucil 1 tablespoon every morning and MiraLAX every evening.  A CBC revealed no evidence of anemia and her iron studies were borderline.  Today she is feeling well with no abdominal pain, nausea, vomiting, diarrhea or constipation.  She is taking Benefiber 1 tablespoon daily and MiraLAX every evening as well as pantoprazole twice daily and is experiencing a daily bowel movement without any rectal bleeding or melena.  There is no heartburn or any other dyspeptic symptoms.  Her appetite and weight are stable.

## 2021-02-05 NOTE — ASSESSMENT
[FreeTextEntry1] : At this time I have recommended that she continue to take Benefiber 1 tablespoon every morning and MiraLAX every evening.  She will change her antacid regimen to pantoprazole 40 mg every morning and famotidine 40 mg every evening.  She will obtain a repeat CBC, iron studies, basic metabolic profile, prothrombin time as well as magnesium and B12 level.  She will be seen again in 6 months for further follow-up.

## 2021-02-05 NOTE — PHYSICAL EXAM
[General Appearance - Alert] : alert [General Appearance - In No Acute Distress] : in no acute distress [General Appearance - Well Nourished] : well nourished [General Appearance - Well Developed] : well developed [General Appearance - Well-Appearing] : healthy appearing [Sclera] : the sclera and conjunctiva were normal [PERRL With Normal Accommodation] : pupils were equal in size, round, and reactive to light [Extraocular Movements] : extraocular movements were intact [Outer Ear] : the ears and nose were normal in appearance [Hearing Threshold Finger Rub Not [place]] : hearing was normal [Examination Of The Oral Cavity] : the lips and gums were normal [Oropharynx] : the oropharynx was normal [Neck Appearance] : the appearance of the neck was normal [Heart Rate And Rhythm] : heart rate was normal and rhythm regular [Bowel Sounds] : normal bowel sounds [Abdomen Soft] : soft [Abdomen Tenderness] : non-tender [Abdomen Mass (___ Cm)] : no abdominal mass palpated [Nail Clubbing] : no clubbing  or cyanosis of the fingernails [Abnormal Walk] : normal gait [Musculoskeletal - Swelling] : no joint swelling seen [Motor Tone] : muscle strength and tone were normal [Skin Color & Pigmentation] : normal skin color and pigmentation [Skin Turgor] : normal skin turgor [] : no rash [Motor Exam] : the motor exam was normal [No Focal Deficits] : no focal deficits [Oriented To Time, Place, And Person] : oriented to person, place, and time [Impaired Insight] : insight and judgment were intact [Affect] : the affect was normal

## 2021-02-08 LAB
ANION GAP SERPL CALC-SCNC: 12 MMOL/L
BASOPHILS # BLD AUTO: 0.04 K/UL
BASOPHILS NFR BLD AUTO: 0.5 %
BUN SERPL-MCNC: 46 MG/DL
CALCIUM SERPL-MCNC: 10.1 MG/DL
CHLORIDE SERPL-SCNC: 98 MMOL/L
CO2 SERPL-SCNC: 29 MMOL/L
CREAT SERPL-MCNC: 1.07 MG/DL
EOSINOPHIL # BLD AUTO: 0.11 K/UL
EOSINOPHIL NFR BLD AUTO: 1.3 %
FERRITIN SERPL-MCNC: 49 NG/ML
GLUCOSE SERPL-MCNC: 97 MG/DL
HCT VFR BLD CALC: 40.9 %
HGB BLD-MCNC: 13.6 G/DL
IMM GRANULOCYTES NFR BLD AUTO: 0.2 %
INR PPP: 0.97 RATIO
IRON SATN MFR SERPL: 36 %
IRON SERPL-MCNC: 139 UG/DL
LYMPHOCYTES # BLD AUTO: 1.64 K/UL
LYMPHOCYTES NFR BLD AUTO: 19.8 %
MAGNESIUM SERPL-MCNC: 2.3 MG/DL
MAN DIFF?: NORMAL
MCHC RBC-ENTMCNC: 28.5 PG
MCHC RBC-ENTMCNC: 33.3 GM/DL
MCV RBC AUTO: 85.6 FL
MONOCYTES # BLD AUTO: 0.46 K/UL
MONOCYTES NFR BLD AUTO: 5.5 %
NEUTROPHILS # BLD AUTO: 6.02 K/UL
NEUTROPHILS NFR BLD AUTO: 72.7 %
PLATELET # BLD AUTO: 293 K/UL
POTASSIUM SERPL-SCNC: 4.3 MMOL/L
PT BLD: 11.5 SEC
RBC # BLD: 4.78 M/UL
RBC # FLD: 16.6 %
SODIUM SERPL-SCNC: 139 MMOL/L
TIBC SERPL-MCNC: 384 UG/DL
UIBC SERPL-MCNC: 245 UG/DL
VIT B12 SERPL-MCNC: 1059 PG/ML
WBC # FLD AUTO: 8.29 K/UL

## 2021-03-16 ENCOUNTER — APPOINTMENT (OUTPATIENT)
Dept: OBGYN | Facility: CLINIC | Age: 86
End: 2021-03-16
Payer: MEDICARE

## 2021-03-16 VITALS
HEIGHT: 57 IN | SYSTOLIC BLOOD PRESSURE: 122 MMHG | TEMPERATURE: 97.2 F | DIASTOLIC BLOOD PRESSURE: 68 MMHG | BODY MASS INDEX: 27.4 KG/M2 | WEIGHT: 127 LBS

## 2021-03-16 PROCEDURE — G0101: CPT

## 2021-03-20 NOTE — END OF VISIT
[FreeTextEntry3] : I, Nida Villalta, solely acted as a scribe for Dr. Narciso Wu on 03/16/2021 . All medical entries made by the Scribe were at my, Dr. Wu's, direction and personally dictated by me on 03/16/2021. I have reviewed the chart and agree that the record accurately reflects my personal performance of the history, physical exam, assessment and plan. I have also personally directed, reviewed and agreed with the chart.

## 2021-03-20 NOTE — PHYSICAL EXAM
[Appropriately responsive] : appropriately responsive [Alert] : alert [No Acute Distress] : no acute distress [No Lymphadenopathy] : no lymphadenopathy [Soft] : soft [Non-tender] : non-tender [Non-distended] : non-distended [No HSM] : No HSM [No Lesions] : no lesions [No Mass] : no mass [Oriented x3] : oriented x3 [Examination Of The Breasts] : a normal appearance [No Discharge] : no discharge [No Masses] : no breast masses were palpable [Labia Majora] : normal [Labia Minora] : normal [Normal] : normal [Atrophy] : atrophy [Dry Mucosa] : dry mucosa [No Bleeding] : There was no active vaginal bleeding [Absent] : absent [Uterine Adnexae] : normal [FreeTextEntry9] : Guaiac negative

## 2021-03-20 NOTE — HISTORY OF PRESENT ILLNESS
[LMP unknown] : LMP unknown [N] : Patient does not use contraception [Y] : Positive pregnancy history [unknown] : Patient is unsure of the date of her LMP [Currently Active] : currently active [Men] : men [Vaginal] : vaginal [No] : No [TextBox_4] : Annual [Mammogramdate] : 6/29/20 [TextBox_19] : br 1 [TextBox_25] : br 1 [PapSmeardate] : 1/15/18 [TextBox_31] : negative [BoneDensityDate] : 1/22/18 [TextBox_37] : osteopenic [ColonoscopyDate] : 2 [TextBox_43] : unsure of date  [PGHxTotal] : 3 [[facility]] : 3 [TextBox_36] : pain with sex [TextBox_38] : n/a [TextBox_28] : n/a [TextBox_34] : n/a [TextBox_18] : n/a [FreeTextEntry1] : pain

## 2021-03-20 NOTE — DISCUSSION/SUMMARY
[FreeTextEntry1] : During this visit comprehensive counseling was given regarding the following concerns:\par \par 1 We discussed the need for a proper nutrition and exercise. This includes cardiovascular and pelvic floor exercise. \par \par 2 We discussed the importance of maintaining a proper vaccination schedule and we discussed the utility of the flu vaccine and TDaP. \par \par 3 We discussed the impact of chronic sun exposure and the risk for skin disease as a result. The benefits of total body scan by a Dermatologist was reviewed. \par \par 4 We discussed the need for certain supplements for most people which include vitamin D3, calcium rich foods with limitation on calcium supplementation by tabular form to 600 mg by mouth daily. As part of a bone health program we recommend weight bearing exercises, and some limited sun exposure (10-15 minutes daily) to help convert vitamin D to its active form.\par \par 5 Prescription for mammogram screening given.\par \par She will return annually or sooner as needed. \par \par During this visit 25 minutes were spent face-to-face with greater than 50% of the time dedicated to counseling.

## 2021-07-01 ENCOUNTER — APPOINTMENT (OUTPATIENT)
Dept: MAMMOGRAPHY | Facility: CLINIC | Age: 86
End: 2021-07-01

## 2021-07-06 ENCOUNTER — APPOINTMENT (OUTPATIENT)
Dept: MAMMOGRAPHY | Facility: CLINIC | Age: 86
End: 2021-07-06
Payer: MEDICARE

## 2021-07-06 ENCOUNTER — OUTPATIENT (OUTPATIENT)
Dept: OUTPATIENT SERVICES | Facility: HOSPITAL | Age: 86
LOS: 1 days | End: 2021-07-06
Payer: MEDICARE

## 2021-07-06 ENCOUNTER — APPOINTMENT (OUTPATIENT)
Dept: ULTRASOUND IMAGING | Facility: CLINIC | Age: 86
End: 2021-07-06
Payer: MEDICARE

## 2021-07-06 ENCOUNTER — RESULT REVIEW (OUTPATIENT)
Age: 86
End: 2021-07-06

## 2021-07-06 DIAGNOSIS — Z98.89 OTHER SPECIFIED POSTPROCEDURAL STATES: Chronic | ICD-10-CM

## 2021-07-06 DIAGNOSIS — N60.19 DIFFUSE CYSTIC MASTOPATHY OF UNSPECIFIED BREAST: ICD-10-CM

## 2021-07-06 DIAGNOSIS — Z95.1 PRESENCE OF AORTOCORONARY BYPASS GRAFT: Chronic | ICD-10-CM

## 2021-07-06 DIAGNOSIS — K31.9 DISEASE OF STOMACH AND DUODENUM, UNSPECIFIED: Chronic | ICD-10-CM

## 2021-07-06 DIAGNOSIS — Z90.710 ACQUIRED ABSENCE OF BOTH CERVIX AND UTERUS: Chronic | ICD-10-CM

## 2021-07-06 PROCEDURE — 77067 SCR MAMMO BI INCL CAD: CPT | Mod: 26

## 2021-07-06 PROCEDURE — 77067 SCR MAMMO BI INCL CAD: CPT

## 2021-07-06 PROCEDURE — 77063 BREAST TOMOSYNTHESIS BI: CPT | Mod: 26

## 2021-07-06 PROCEDURE — 77063 BREAST TOMOSYNTHESIS BI: CPT

## 2022-03-17 ENCOUNTER — APPOINTMENT (OUTPATIENT)
Dept: OBGYN | Facility: CLINIC | Age: 87
End: 2022-03-17
Payer: MEDICARE

## 2022-03-17 VITALS
DIASTOLIC BLOOD PRESSURE: 72 MMHG | HEIGHT: 57 IN | SYSTOLIC BLOOD PRESSURE: 118 MMHG | BODY MASS INDEX: 28.05 KG/M2 | WEIGHT: 130 LBS

## 2022-03-17 PROCEDURE — 82270 OCCULT BLOOD FECES: CPT

## 2022-03-17 PROCEDURE — 99397 PER PM REEVAL EST PAT 65+ YR: CPT | Mod: GY

## 2022-03-17 PROCEDURE — G0101: CPT

## 2022-03-17 RX ORDER — PANTOPRAZOLE 40 MG/1
40 TABLET, DELAYED RELEASE ORAL DAILY
Qty: 30 | Refills: 5 | Status: DISCONTINUED | COMMUNITY
Start: 2021-02-05 | End: 2022-03-17

## 2022-03-17 RX ORDER — PANTOPRAZOLE 40 MG/1
40 TABLET, DELAYED RELEASE ORAL
Qty: 60 | Refills: 5 | Status: DISCONTINUED | COMMUNITY
Start: 2020-10-22 | End: 2022-03-17

## 2022-03-18 NOTE — END OF VISIT
[FreeTextEntry3] : I, Nida Villalta, solely acted as a scribe for Dr. Narciso Wu on 03/17/2022 . All medical entries made by the Scribe were at my, Dr. Wu's, direction and personally dictated by me on 03/17/2022. I have reviewed the chart and agree that the record accurately reflects my personal performance of the history, physical exam, assessment and plan. I have also personally directed, reviewed and agreed with the chart.

## 2022-03-18 NOTE — DISCUSSION/SUMMARY
[FreeTextEntry1] : During this visit comprehensive counseling was given regarding the following concerns:\par \par 1 We discussed the need for a proper nutrition and exercise. This includes cardiovascular and pelvic floor exercise. \par \par 2 We discussed the importance of maintaining a proper vaccination schedule and we discussed the utility of the flu vaccine and TDaP. \par \par 3 We discussed the impact of chronic sun exposure and the risk for skin disease as a result. The benefits of total body scan by a Dermatologist was reviewed. \par \par 4 We discussed the need for certain supplements for most people which include vitamin D3, calcium rich foods with limitation on calcium supplementation by tabular form to 600 mg by mouth daily. As part of a bone health program we recommend weight bearing exercises, and some limited sun exposure (10-15 minutes daily) to help convert vitamin D to its active form.\par \par 5 Prescription for mammogram screening given. \par \par She will follow up for her annual exam or as needed. \par \par During this visit 20 minutes were spent face-to-face with greater than 50% of the time dedicated to counseling.

## 2022-03-18 NOTE — PHYSICAL EXAM
[Appropriately responsive] : appropriately responsive [Alert] : alert [No Acute Distress] : no acute distress [No Lymphadenopathy] : no lymphadenopathy [Soft] : soft [Non-tender] : non-tender [Non-distended] : non-distended [No HSM] : No HSM [No Lesions] : no lesions [No Mass] : no mass [Oriented x3] : oriented x3 [Examination Of The Breasts] : a normal appearance [No Discharge] : no discharge [No Masses] : no breast masses were palpable [Labia Majora] : normal [Labia Minora] : normal [Normal] : normal [Atrophy] : atrophy [No Bleeding] : There was no active vaginal bleeding [Absent] : absent [Uterine Adnexae] : normal [No Tenderness] : no tenderness [Nl Sphincter Tone] : normal sphincter tone [FreeTextEntry9] : Guaiac negative

## 2022-03-18 NOTE — HISTORY OF PRESENT ILLNESS
[LMP unknown] : LMP unknown [postmenopausal] : postmenopausal [N] : Patient is not sexually active [Y] : Positive pregnancy history [unknown] : Patient is unsure of the date of her LMP [No] : Patient does not have concerns regarding sex [Previously active] : previously active [FreeTextEntry1] : MICHAEL  presents for her annual exam.\par \par She is doing well. \par \par Her pap smear from 1/15/2018 was normal. \par \par Her mammogram screening from 7/6/21 was Birads 1.  \par \par   [TextBox_4] : Pt presents for an annual exam [Mammogramdate] : 07/06/2021 [TextBox_19] : BR1 [PapSmeardate] : 01/15/2018 [TextBox_31] : NORMAL [BoneDensityDate] : 01/22/2018 [TextBox_37] : OSTEOPENIA [PGHxTotal] : 3 [[facility]] : 2

## 2022-07-14 ENCOUNTER — APPOINTMENT (OUTPATIENT)
Dept: MAMMOGRAPHY | Facility: CLINIC | Age: 87
End: 2022-07-14

## 2022-07-14 ENCOUNTER — RESULT REVIEW (OUTPATIENT)
Age: 87
End: 2022-07-14

## 2022-07-14 ENCOUNTER — OUTPATIENT (OUTPATIENT)
Dept: OUTPATIENT SERVICES | Facility: HOSPITAL | Age: 87
LOS: 1 days | End: 2022-07-14
Payer: MEDICARE

## 2022-07-14 DIAGNOSIS — Z95.1 PRESENCE OF AORTOCORONARY BYPASS GRAFT: Chronic | ICD-10-CM

## 2022-07-14 DIAGNOSIS — K31.9 DISEASE OF STOMACH AND DUODENUM, UNSPECIFIED: Chronic | ICD-10-CM

## 2022-07-14 DIAGNOSIS — Z98.89 OTHER SPECIFIED POSTPROCEDURAL STATES: Chronic | ICD-10-CM

## 2022-07-14 DIAGNOSIS — Z00.00 ENCOUNTER FOR GENERAL ADULT MEDICAL EXAMINATION WITHOUT ABNORMAL FINDINGS: ICD-10-CM

## 2022-07-14 DIAGNOSIS — Z90.710 ACQUIRED ABSENCE OF BOTH CERVIX AND UTERUS: Chronic | ICD-10-CM

## 2022-07-14 PROCEDURE — 77063 BREAST TOMOSYNTHESIS BI: CPT | Mod: 26

## 2022-07-14 PROCEDURE — 77067 SCR MAMMO BI INCL CAD: CPT

## 2022-07-14 PROCEDURE — 77063 BREAST TOMOSYNTHESIS BI: CPT

## 2022-07-14 PROCEDURE — 77067 SCR MAMMO BI INCL CAD: CPT | Mod: 26

## 2023-05-03 ENCOUNTER — APPOINTMENT (OUTPATIENT)
Dept: OBGYN | Facility: CLINIC | Age: 88
End: 2023-05-03

## 2024-01-05 NOTE — ED PROCEDURE NOTE - CPROC ED SITE PREP1
Detail Level: Detailed Detail Level: Generalized Patient Specific Counseling (Will Not Stick From Patient To Patient): [employer] 30 min prior to sun exposure povidone iodine

## 2024-01-14 NOTE — PHYSICAL THERAPY INITIAL EVALUATION ADULT - RANGE OF MOTION EXAMINATION, REHAB EVAL
Left DF decreased to neutral, otherwise Left LE WNL/bilateral upper extremity ROM was WNL (within normal limits)/Right LE ROM was WNL (within normal limits) Keri Simms Admission

## 2024-01-16 NOTE — BRIEF OPERATIVE NOTE - SPECIMENS
Sandhya ULISES Mckinney  1953  [id number]    Situation:  Verbal report received from: Mayelin Salazar RN  Procedure: Procedure(s):  COLONOSCOPY    Background:    Preoperative diagnosis: Screening for colon cancer [Z12.11]  Postoperative diagnosis: * No post-op diagnosis entered *    :  Dr. Montenegro  Assistant(s): Circulator: Mayelin Salazar RN  Endoscopy Technician: Cary Mason    Specimens: * No specimens in log *  H. Pylori   no    Assessment:   Anesthesia gave intra-procedure sedation and medications, see anesthesia flow sheet     Intravenous fluids: NS@ KVO     Vital signs stable     Abdominal assessment: round and soft     Recommendation:  Discharge patient per MD order.  Family or Friend   Permission to share finding with family or friend  yes    
antrum and body

## 2024-11-03 NOTE — PATIENT PROFILE ADULT - FALL HARM RISK CONCLUSION
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      Following a comprehensive consultation and discussion, radiation was recommended to Bhavya Claros.  The rationale and the anticipated acute- and long-term side effects of this recommendation were discussed in detail with Bhavya Claros.  Informed consent was obtained and documented with a signed consent form.    Radiation Treatments     Active   No active radiation treatments to show.   Historical   C3-T3 (Started on 5/22/2023)   Most recent fraction: 300 cGy given on 6/7/2023   Total given: 3,000 cGy / 3,000 cGy  (10 of 10 fractions)   Elapsed Days: 16   Technique: 3D CONFORMAL                   The dosimetry to organs at risk was carefully reviewed, and is available in Physics documents, for the entire treated volume and was deemed to be acceptable.  All organs at risk were carefully contoured and doses minimized, where appropriate, to the extent possible.    Treatment Course:  Bhavya Claros was seen weekly on treatment reviews and the acute treatment symptoms were accordingly managed as the treatment progressed.  These toxicities included odynophagia and dysphagia which was 
managed with Magic mouth rinse, and Norco.     Bhavya Claros was seen at the end of the treatment and given a return follow-up appointment in 1 month, as needed.  
Her/She
Fall with Harm Risk

## 2024-12-04 ENCOUNTER — EMERGENCY (EMERGENCY)
Facility: HOSPITAL | Age: 88
LOS: 1 days | Discharge: HOME | End: 2024-12-04
Attending: EMERGENCY MEDICINE
Payer: MEDICARE

## 2024-12-04 VITALS
TEMPERATURE: 98 F | OXYGEN SATURATION: 95 % | DIASTOLIC BLOOD PRESSURE: 59 MMHG | SYSTOLIC BLOOD PRESSURE: 125 MMHG | HEART RATE: 68 BPM | RESPIRATION RATE: 18 BRPM

## 2024-12-04 VITALS
DIASTOLIC BLOOD PRESSURE: 70 MMHG | OXYGEN SATURATION: 95 % | HEART RATE: 60 BPM | RESPIRATION RATE: 18 BRPM | TEMPERATURE: 98 F | SYSTOLIC BLOOD PRESSURE: 110 MMHG

## 2024-12-04 DIAGNOSIS — Z98.89 OTHER SPECIFIED POSTPROCEDURAL STATES: Chronic | ICD-10-CM

## 2024-12-04 DIAGNOSIS — Z90.710 ACQUIRED ABSENCE OF BOTH CERVIX AND UTERUS: Chronic | ICD-10-CM

## 2024-12-04 DIAGNOSIS — Z95.1 PRESENCE OF AORTOCORONARY BYPASS GRAFT: Chronic | ICD-10-CM

## 2024-12-04 DIAGNOSIS — K31.9 DISEASE OF STOMACH AND DUODENUM, UNSPECIFIED: Chronic | ICD-10-CM

## 2024-12-04 PROCEDURE — 73502 X-RAY EXAM HIP UNI 2-3 VIEWS: CPT | Mod: 26,LT

## 2024-12-04 PROCEDURE — 72125 CT NECK SPINE W/O DYE: CPT | Mod: 26,MC

## 2024-12-04 PROCEDURE — 70450 CT HEAD/BRAIN W/O DYE: CPT | Mod: 26,MC

## 2024-12-04 PROCEDURE — 73502 X-RAY EXAM HIP UNI 2-3 VIEWS: CPT

## 2024-12-04 PROCEDURE — 72192 CT PELVIS W/O DYE: CPT | Mod: MC

## 2024-12-04 PROCEDURE — 70450 CT HEAD/BRAIN W/O DYE: CPT | Mod: MC

## 2024-12-04 PROCEDURE — 72125 CT NECK SPINE W/O DYE: CPT | Mod: MC

## 2024-12-04 PROCEDURE — 99284 EMERGENCY DEPT VISIT MOD MDM: CPT | Mod: 25

## 2024-12-04 PROCEDURE — 99284 EMERGENCY DEPT VISIT MOD MDM: CPT

## 2024-12-04 PROCEDURE — 72192 CT PELVIS W/O DYE: CPT | Mod: 26,MC

## 2024-12-04 RX ORDER — ACETAMINOPHEN 500MG 500 MG/1
650 TABLET, COATED ORAL ONCE
Refills: 0 | Status: COMPLETED | OUTPATIENT
Start: 2024-12-04 | End: 2024-12-04

## 2024-12-04 RX ADMIN — ACETAMINOPHEN 500MG 650 MILLIGRAM(S): 500 TABLET, COATED ORAL at 12:45

## 2024-12-04 NOTE — ED PROVIDER NOTE - CLINICAL SUMMARY MEDICAL DECISION MAKING FREE TEXT BOX
89-year-old female  with history of dementia presents  ED via EMS after a witnessed fall at assisted living this morning.  Patient reportedly was reaching for something and fell.  Initially EMS reported no head strike and on questioning patient she believes she may have struck her head, however patient is confused and only oriented to person.  Patient also complaining of left pelvis/hip pain with pain on active range of motion patient denies any associate chest pain shortness of breath, abdominal pain, nausea, vomiting, visual changes or syncope.   will check x-rays CT head C-spine and pelvis to rule out left hip fracture and reassess

## 2024-12-04 NOTE — ED PROVIDER NOTE - OBJECTIVE STATEMENT
89-year-old female  with history of dementia presents  ED via EMS after a witnessed fall at assisted living this morning.  Patient reportedly was reaching for something and fell.  Initially EMS reported no head strike and on questioning patient she believes she may have struck her head, however patient is confused and only oriented to person.  Patient also complaining of left pelvis/hip pain with pain on active range of motion patient denies any associate chest pain shortness of breath, abdominal pain, nausea, vomiting, visual changes or syncope

## 2024-12-04 NOTE — ED PROVIDER NOTE - MUSCULOSKELETAL, MLM
Spine appears normal, range of motion is not limited, no midline tenderness bony step-off or deformity, left hip with pain on active range of motion

## 2024-12-04 NOTE — ED ADULT NURSE NOTE - OBJECTIVE STATEMENT
Pt awake, NAD. Pt BIBEMS from assisted living s/p fall. Pt with hx of dementia, poor historian. Unknown if LOC or hit head. Pt with complaints of left hip pain.

## 2024-12-04 NOTE — ED ADULT TRIAGE NOTE - CHIEF COMPLAINT QUOTE
BIBA from the [facility] c/o unwitnessed mechanical fall. Pt states she was reaching for clothes and slipped and fell. Hx of dementia, as per ems pt is at baseline mental status. As per EMS no head strike, no LOC, no use of blood thinners. States she has left hip pain.

## 2024-12-04 NOTE — ED PROVIDER NOTE - PROGRESS NOTE DETAILS
CT results and x-rays as noted.  Patient ambulatory in ED and stable for discharge back to assisted living residence

## 2024-12-04 NOTE — ED PROVIDER NOTE - CPE EDP CARDIAC NORM
Abdomen soft, obese, non-tender, no guarding. No palpable hernia to L groin. Pain worse to L pelvic area. normal...

## 2024-12-04 NOTE — ED PROVIDER NOTE - CCCP TRG CHIEF CMPLNT
fall Tranexamic Acid Counseling:  Patient advised of the small risk of bleeding problems with tranexamic acid. They were also instructed to call if they developed any nausea, vomiting or diarrhea. All of the patient's questions and concerns were addressed.

## 2024-12-04 NOTE — ED ADULT NURSE REASSESSMENT NOTE - NS ED NURSE REASSESS COMMENT FT1
Assumed care of patient at 1915, report received from off-going RN. Respirations even and unlabored, resting in ED stretcher, NAD. Wheels locked, bed in lowest position, Patient discharged, pending transportation

## 2024-12-04 NOTE — ED PROVIDER NOTE - PATIENT PORTAL LINK FT
You can access the FollowMyHealth Patient Portal offered by [facility] by registering at the following website: [email or web address]. By joining [employer]’s FollowMyHealth portal, you will also be able to view your health information using other applications (apps) compatible with our system.

## 2024-12-04 NOTE — ED ADULT NURSE NOTE - NSFALLHARMRISKINTERV_ED_ALL_ED
Assistance OOB with selected safe patient handling equipment if applicable/Assistance with ambulation/Communicate risk of Fall with Harm to all staff, patient, and family/Monitor gait and stability/Monitor for mental status changes and reorient to person, place, and time, as needed/Provide visual cue: red socks, yellow wristband, yellow gown, etc/Reinforce activity limits and safety measures with patient and family/Toileting schedule using arm’s reach rule for commode and bathroom/Use of alarms - bed, stretcher, chair and/or video monitoring/Bed in lowest position, wheels locked, appropriate side rails in place/Call bell, personal items and telephone in reach/Instruct patient to call for assistance before getting out of bed/chair/stretcher/Non-slip footwear applied when patient is off stretcher/[place] to call system/Physically safe environment - no spills, clutter or unnecessary equipment/Purposeful Proactive Rounding/Room/bathroom lighting operational, light cord in reach

## 2024-12-04 NOTE — ED ADULT NURSE NOTE - CHIEF COMPLAINT QUOTE
BIBA from the [facility] c/o unwitnessed mechanical fall. Pt states she was reaching for clothes and slipped and fell. Hx of dementia, as per ems pt is at baseline mental status. As per EMS no head strike, no LOC, no use of blood thinners. States she has left hip pain.

## 2024-12-04 NOTE — ED PROVIDER NOTE - NSFOLLOWUPINSTRUCTIONS_ED_ALL_ED_FT
strength Tylenol 2 tablets every 6 hours as needed for pain   apply ice to bruised areas 20 minutes on every 3-4 hours as needed   follow-up with your doctor next 1 to 2 days   Return sooner for any problems      Contusion    A contusion is a deep bruise. Contusions are the result of a blunt injury to tissues and muscle fibers under the skin. The skin overlying the contusion may turn blue, purple, or yellow. Symptoms also include pain and swelling in the injured area.    SEEK IMMEDIATE MEDICAL CARE IF YOU HAVE ANY OF THE FOLLOWING SYMPTOMS: severe pain, numbness, tingling, pain, weakness, or skin color/temperature change in any part of your body distal to the injury.

## 2025-03-28 NOTE — DIETITIAN INITIAL EVALUATION ADULT. - OTHER INFO
JOJO/Edy Berrios. Lives in [place]  Occupation: Registered nurse, medical assistant  Hobbies: Not specified  PCP: No primary care provider on file.  " 87 year old female with PMH of CAD s/p CABG, arthritis, hiatal hernia presents with 2 day history of black stools and dark vomitus. Found to have GI bleed with hypotension and mild tachycardia, most likely UGIB possible ulcer vs. slow LGIB. Pt reports poor appetite/po intake over the last 3 weeks due to not feeling well with subsequent weight loss of ~11 lbs during this time ( lbs now down to 124 lbs). Prior to this, was eating with fair po intake, pt states she is typically not a big eater. Pt states she follows a heart healthy diet at home. Denies any chewing/swallowing difficulty. Diet education provided.